# Patient Record
Sex: FEMALE | Race: WHITE | Employment: OTHER | URBAN - METROPOLITAN AREA
[De-identification: names, ages, dates, MRNs, and addresses within clinical notes are randomized per-mention and may not be internally consistent; named-entity substitution may affect disease eponyms.]

---

## 2017-03-15 RX ORDER — BUPROPION HYDROCHLORIDE 150 MG/1
150 TABLET ORAL
Qty: 90 TAB | Refills: 6 | Status: SHIPPED | OUTPATIENT
Start: 2017-03-15 | End: 2017-05-31 | Stop reason: SDUPTHER

## 2017-03-15 NOTE — TELEPHONE ENCOUNTER
Requested Prescriptions     Pending Prescriptions Disp Refills    buPROPion XL (WELLBUTRIN XL) 150 mg tablet 30 Tab 6     Sig: Take 1 Tab by mouth every morning.      Last OV-12/22/16  Next OV-6/30/17  Med refilled-12/22/16    Pt wants a 90 supply

## 2017-05-30 ENCOUNTER — DOCUMENTATION ONLY (OUTPATIENT)
Dept: INTERNAL MEDICINE CLINIC | Age: 67
End: 2017-05-30

## 2017-05-30 NOTE — PROGRESS NOTES
Medicare Part B Preventive Services Guidelines/Limitations Date last completed and Frequency Due Date   Bone Mass Measurement  (age 72 & older, biennial) Requires diagnosis related to osteoporosis or estrogen deficiency. Biennial benefit unless patient has history of long-term glucocorticoid tx or baseline is needed because initial test was by other method Completed Recommended every 2 years Due    Cardiovascular Screening Blood Tests (every 5 years)  Total cholesterol, HDL, Triglycerides Order as a panel if possible Completed   Recommended annually Due    Colorectal Cancer Screening  -Fecal occult blood test (annual)  -Flexible sigmoidoscopy (5y)  -Screening colonoscopy (10y)  -Barium Enema  Completed   Recommended every  years  Due     Counseling to Prevent Tobacco Use (up to 8 sessions per year)  - Counseling greater than 3 and up to 10 minutes  - Counseling greater than 10 minutes Patients must be asymptomatic of tobacco-related conditions to receive as preventive service     Diabetes Screening Tests (at least every 3 years, Medicare covers annually or at 6-month intervals for prediabetic patients)    Fasting blood sugar (FBS) or glucose tolerance test (GTT) Patient must be diagnosed with one of the following:  -Hypertension, Dyslipidemia, obesity, previous impaired FBS or GTT  Or any two of the following: overweight, FH of diabetes, age ? 72, history of gestational diabetes, birth of baby weighing more than 9 pounds Completed:     Recommended every 3 years for non-diabetics    Recommended every 3-6 months for Pre-Diabetics and Diabetics Due    Diabetes Self-Management Training (DSMT) (no USPSTF recommendation) Requires referral by treating physician for patient with diabetes or renal disease. 10 hours of initial DSMT session of no less than 30 minutes each in a continuous 12-month period. 2 hours of follow-up DSMT in subsequent years.      Glaucoma Screening (no USPSTF recommendation) Diabetes mellitus, family history, , age 48 or over,  American, age 72 or over Completed  Recommended annually Due   Human Immunodeficiency Virus (HIV) Screening (annually for increased risk patients)  HIV-1 and HIV-2 by EIA, NATALIE, rapid antibody test, or oral mucosa transudate Patient must be at increased risk for HIV infection per USPSTF guidelines or pregnant. Tests covered annually for patients at increased risk. Pregnant patients may receive up to 3 test during pregnancy. Medical Nutrition Therapy (MNT) (for diabetes or renal disease not recommended schedule) Requires referral by treating physician for patient with diabetes or renal disease. Can be provided in same year as diabetes self-management training (DSMT), and CMS recommends medical nutrition therapy take place after DSMT. Up to 3 hours for initial year and 2 hours in subsequent years. Prostate Cancer Screening (annually up to age 76)  - Digital rectal exam (TELLO)  - Prostate specific antigen (PSA) Annually (age 48 or over), TELLO not paid separately when covered E/M service is provided on same date Completed  Recommended annually to age 76 Due   Seasonal Influenza Vaccination (annually)  Completed   Recommended Annually Due    Pneumococcal Vaccination (once after 72)  Pneumococcal 23 -  Recommended once over the age of 72    Prevnar 15 - Recommended once over the age of 72 Complete        Complted   Hepatitis B Vaccinations (if medium/high risk) Medium/high risk factors:  End-stage renal disease,  Hemophiliacs who received Factor VIII or IX concentrates, Clients of institutions for the mentally retarded, Persons who live in the same house as a HepB virus carrier, Homosexual men, Illicit injectable drug abusers. Screening Mammography (biennial age 54-69)?  Annually (age 36 or over) Completed    Due    Screening Pap Tests and Pelvic Examination (up to age 79 and after 79 if unknown history or abnormal study last 10 years) Every 25 months except high risk Completed  Due   Ultrasound Screening for Abdominal Aortic Aneurysm (AAA) (once) Patient must be referred through IPPE and not have had a screening for abdominal aortic aneurysm before under Medicare.   Limited to patients who meet one of the following criteria:  - Men who are 73-68 years old and have smoked more than 100 cigarettes in their lifetime.  -Anyone with a FH of AAA  -[facility]     [facility]

## 2017-05-30 NOTE — PROGRESS NOTES
Medicare Part B Preventive Services Guidelines/Limitations Date last completed and Frequency Due Date   Bone Mass Measurement  (age 72 & older, biennial) Requires diagnosis related to osteoporosis or estrogen deficiency. Biennial benefit unless patient has history of long-term glucocorticoid tx or baseline is needed because initial test was by other method Completed 8/2015    Recommended every 2 years Due 8/2017   Cardiovascular Screening Blood Tests (every 5 years)  Total cholesterol, HDL, Triglycerides Order as a panel if possible Recommended annually  8/16 Due now   Colorectal Cancer Screening  -Fecal occult blood test (annual)  -Flexible sigmoidoscopy (5y)  -Screening colonoscopy (10y)  -Barium Enema   Completed 7/2014  Recommended every years  Due 2/2017 for multiple polyps, scheduled 2/17 with Keila Sahu     Counseling to Prevent Tobacco Use (up to 8 sessions per year)  - Counseling greater than 3 and up to 10 minutes  - Counseling greater than 10 minutes Patients must be asymptomatic of tobacco-related conditions to receive as preventive service Counseled 12/2016      Diabetes Screening Tests (at least every 3 years, Medicare covers annually or at 6-month intervals for prediabetic patients)     Fasting blood sugar (FBS) or glucose tolerance test (GTT) Patient must be diagnosed with one of the following:  -Hypertension, Dyslipidemia, obesity, previous impaired FBS or GTT  Or any two of the following: overweight, FH of diabetes, age ? 72, history of gestational diabetes, birth of baby weighing more than 9 pounds Recommended every 3 years for non-diabetics     Recommended every 3-6 months for Pre-Diabetics and Diabetics Due now   Diabetes Self-Management Training (DSMT) (no USPSTF recommendation) Requires referral by treating physician for patient with diabetes or renal disease. 10 hours of initial DSMT session of no less than 30 minutes each in a continuous 12-month period.  2 hours of follow-up DSMT in subsequent years. N/A  N/A   Glaucoma Screening (no USPSTF recommendation) Diabetes mellitus, family history, , age 48 or over,  American, age 72 or over Completed 11/2016    Recommended annually Due 11/2017   Human Immunodeficiency Virus (HIV) Screening (annually for increased risk patients)  HIV-1 and HIV-2 by EIA, NATALIE, rapid antibody test, or oral mucosa transudate Patient must be at increased risk for HIV infection per USPSTF guidelines or pregnant. Tests covered annually for patients at increased risk. Pregnant patients may receive up to 3 test during pregnancy.  N/A  N/A   Medical Nutrition Therapy (MNT) (for diabetes or renal disease not recommended schedule) Requires referral by treating physician for patient with diabetes or renal disease. Can be provided in same year as diabetes self-management training (DSMT), and CMS recommends medical nutrition therapy take place after DSMT. Up to 3 hours for initial year and 2 hours in subsequent years.  N/A  N/A   Prostate Cancer Screening (annually up to age 76)  - Digital rectal exam (TELLO)  - Prostate specific antigen (PSA) Annually (age 48 or over), TELLO not paid separately when covered E/M service is provided on same date N/A N/A   Seasonal Influenza Vaccination (annually)   Recommended Annually Due now, pt declined at 12/2016 visit   Pneumococcal Vaccination (once after 72)   Pneumococcal 23 -  Recommended once over the age of 72     Prevnar 15 - Recommended once over the age of 72 Due now           Due now, declined at last visit   Hepatitis B Vaccinations (if medium/high risk) Medium/high risk factors: End-stage renal disease,  Hemophiliacs who received Factor VIII or IX concentrates, Clients of institutions for the mentally retarded, Persons who live in the same house as a HepB virus carrier, Homosexual men, Illicit injectable drug abusers.  N/A  N/A   Screening Mammography (biennial age 54-69)?  Annually (age 36 or over) Completed 8/2016 Due 8/2017   Screening Pap Tests and Pelvic Examination (up to age 79 and after 79 if unknown history or abnormal study last 10 years) Every 25 months except high risk Completed 8/2016 Due 8/2018   Ultrasound Screening for Abdominal Aortic Aneurysm (AAA) (once) Patient must be referred through [facility] and not have had a screening for abdominal aortic aneurysm before under Medicare. Limited to patients who meet one of the following criteria:  - Men who are 73-68 years old and have smoked more than 100 cigarettes in their lifetime.  -Anyone with a FH of AAA  -Anyone recommended for screening by USPSTF  N/A  N/A     Please bring in a copy of your advanced directive to your next office visit so we can have a copy on file.

## 2017-05-31 ENCOUNTER — DOCUMENTATION ONLY (OUTPATIENT)
Dept: INTERNAL MEDICINE CLINIC | Age: 67
End: 2017-05-31

## 2017-05-31 ENCOUNTER — OFFICE VISIT (OUTPATIENT)
Dept: INTERNAL MEDICINE CLINIC | Age: 67
End: 2017-05-31

## 2017-05-31 VITALS
OXYGEN SATURATION: 96 % | BODY MASS INDEX: 31.18 KG/M2 | WEIGHT: 176 LBS | RESPIRATION RATE: 16 BRPM | DIASTOLIC BLOOD PRESSURE: 74 MMHG | HEIGHT: 63 IN | HEART RATE: 73 BPM | SYSTOLIC BLOOD PRESSURE: 143 MMHG | TEMPERATURE: 98.1 F

## 2017-05-31 DIAGNOSIS — Z13.39 SCREENING FOR ALCOHOLISM: ICD-10-CM

## 2017-05-31 DIAGNOSIS — Z01.818 PREOP EXAM FOR INTERNAL MEDICINE: Primary | ICD-10-CM

## 2017-05-31 DIAGNOSIS — I10 ESSENTIAL HYPERTENSION: ICD-10-CM

## 2017-05-31 DIAGNOSIS — F32.9 REACTIVE DEPRESSION: ICD-10-CM

## 2017-05-31 DIAGNOSIS — Z00.00 ROUTINE GENERAL MEDICAL EXAMINATION AT A HEALTH CARE FACILITY: ICD-10-CM

## 2017-05-31 DIAGNOSIS — R01.1 CARDIAC MURMUR: ICD-10-CM

## 2017-05-31 DIAGNOSIS — R91.8 ABNORMAL CT LUNG SCREENING: ICD-10-CM

## 2017-05-31 RX ORDER — ACETAMINOPHEN 325 MG/1
TABLET ORAL
COMMUNITY

## 2017-05-31 RX ORDER — LOSARTAN POTASSIUM 25 MG/1
25 TABLET ORAL DAILY
Qty: 30 TAB | Refills: 1 | Status: SHIPPED | OUTPATIENT
Start: 2017-05-31 | End: 2017-08-08

## 2017-05-31 RX ORDER — BUPROPION HYDROCHLORIDE 150 MG/1
150 TABLET ORAL
Qty: 90 TAB | Refills: 2 | Status: SHIPPED | OUTPATIENT
Start: 2017-05-31 | End: 2018-02-20 | Stop reason: SDUPTHER

## 2017-05-31 NOTE — TELEPHONE ENCOUNTER
Requested Prescriptions     Pending Prescriptions Disp Refills    losartan (COZAAR) 25 mg tablet 30 Tab 1     Sig: Take 1 Tab by mouth daily.

## 2017-05-31 NOTE — PATIENT INSTRUCTIONS
Medicare Part B Preventive Services Guidelines/Limitations Date last completed and Frequency Due Date   Bone Mass Measurement  (age 72 & older, biennial) Requires diagnosis related to osteoporosis or estrogen deficiency. Biennial benefit unless patient has history of long-term glucocorticoid tx or baseline is needed because initial test was by other method Completed 8/2015     Recommended every 2 years Due 8/2017   Cardiovascular Screening Blood Tests (every 5 years)  Total cholesterol, HDL, Triglycerides Order as a panel if possible Recommended annually  8/16 Due now   Colorectal Cancer Screening  -Fecal occult blood test (annual)  -Flexible sigmoidoscopy (5y)  -Screening colonoscopy (10y)  -Barium Enema    Completed 4/17  Recommended every 5 years  Due 4/2022 with Keila Sahu     Counseling to Prevent Tobacco Use (up to 8 sessions per year)  - Counseling greater than 3 and up to 10 minutes  - Counseling greater than 10 minutes Patients must be asymptomatic of tobacco-related conditions to receive as preventive service Counseled 12/2016      Diabetes Screening Tests (at least every 3 years, Medicare covers annually or at 6-month intervals for prediabetic patients)      Fasting blood sugar (FBS) or glucose tolerance test (GTT) Patient must be diagnosed with one of the following:  -Hypertension, Dyslipidemia, obesity, previous impaired FBS or GTT  Or any two of the following: overweight, FH of diabetes, age ? 72, history of gestational diabetes, birth of baby weighing more than 9 pounds Recommended every 3 years for non-diabetics      Recommended every 3-6 months for Pre-Diabetics and Diabetics Due now   Diabetes Self-Management Training (DSMT) (no USPSTF recommendation) Requires referral by treating physician for patient with diabetes or renal disease. 10 hours of initial DSMT session of no less than 30 minutes each in a continuous 12-month period. 2 hours of follow-up DSMT in subsequent years.  N/A  N/A   Glaucoma Screening (no USPSTF recommendation) Diabetes mellitus, family history, , age 48 or over,  American, age 72 or over Completed 11/2016     Recommended annually Due 11/2017   Human Immunodeficiency Virus (HIV) Screening (annually for increased risk patients)  HIV-1 and HIV-2 by EIA, NATALIE, rapid antibody test, or oral mucosa transudate Patient must be at increased risk for HIV infection per USPSTF guidelines or pregnant. Tests covered annually for patients at increased risk. Pregnant patients may receive up to 3 test during pregnancy.  N/A  N/A   Medical Nutrition Therapy (MNT) (for diabetes or renal disease not recommended schedule) Requires referral by treating physician for patient with diabetes or renal disease. Can be provided in same year as diabetes self-management training (DSMT), and CMS recommends medical nutrition therapy take place after DSMT. Up to 3 hours for initial year and 2 hours in subsequent years.  N/A  N/A   Prostate Cancer Screening (annually up to age 76)  - Digital rectal exam (TELLO)  - Prostate specific antigen (PSA) Annually (age 48 or over), TELLO not paid separately when covered E/M service is provided on same date N/A N/A   Seasonal Influenza Vaccination (annually)    Recommended Annually Due now, pt declined at 12/2016 visit   Pneumococcal Vaccination (once after 72)    Pneumococcal 23 -  Recommended once over the age of 72  [de-identified]  Prevnar 15 - Recommended once over the age of 72 Due now declined  [de-identified]          Due now, declined at last visit   Hepatitis B Vaccinations (if medium/high risk) Medium/high risk factors: End-stage renal disease,  Hemophiliacs who received Factor VIII or IX concentrates, Clients of institutions for the mentally retarded, Persons who live in the same house as a HepB virus carrier, Homosexual men, Illicit injectable drug abusers.  N/A  N/A   Screening Mammography (biennial age 54-69)?  Annually (age 36 or over) Completed 8/2016 Due 8/2017 Screening Pap Tests and Pelvic Examination (up to age 79 and after 79 if unknown history or abnormal study last 10 years) Every 24 months except high risk Completed 8/2016 Due 8/2018   Ultrasound Screening for Abdominal Aortic Aneurysm (AAA) (once) Patient must be referred through [facility] and not have had a screening for abdominal aortic aneurysm before under Medicare. Limited to patients who meet one of the following criteria:  - Men who are 73-68 years old and have smoked more than 100 cigarettes in their lifetime.  -Anyone with a FH of AAA  -Anyone recommended for screening by USPSTF  N/A--no family history of this  N/A      Please bring in a copy of your advanced directive to your next office visit so we can have a copy on file.       Return for bloodwork

## 2017-05-31 NOTE — MR AVS SNAPSHOT
Visit Information Date & Time Provider Department Dept. Phone Encounter #  
 5/31/2017  2:00 PM Jair Mccarthy, [address] [phone] [id number] Upcoming Health Maintenance Date Due Hepatitis C Screening 1950 ZOSTER VACCINE AGE 60> 5/6/2012 GLAUCOMA SCREENING Q2Y 5/6/2017 OSTEOPOROSIS SCREENING (DEXA) 5/6/2017 Pneumococcal 65+ Low/Medium Risk (1 of 2 - PCV13) 5/6/2017 MEDICARE YEARLY EXAM 5/6/2017 INFLUENZA AGE 9 TO ADULT 8/1/2017 BREAST CANCER SCRN MAMMOGRAM 8/18/2018 COLONOSCOPY 7/14/2019 PAP AKA CERVICAL CYTOLOGY 8/18/2019 DTaP/Tdap/Td series (2 - Td) 1/2/2022 Allergies as of 5/31/2017  Review Complete On: 5/31/2017 By: Harpreet Thompson LPN Severity Noted Reaction Type Reactions Erythromycin  12/22/2016    Other (comments) Stomach cramps Morphine  12/22/2016    Itching Current Immunizations  Never Reviewed No immunizations on file. Not reviewed this visit You Were Diagnosed With   
  
 Codes Comments Abnormal CT lung screening    -  Primary ICD-10-CM: R91.8 ICD-9-CM: 793.2 Routine general medical examination at a health care facility     ICD-10-CM: Z00.00 ICD-9-CM: V70.0 Screening for alcoholism     ICD-10-CM: Z13.89 ICD-9-CM: V79.1 Reactive depression     ICD-10-CM: F32.9 ICD-9-CM: 300.4 Preop exam for internal medicine     ICD-10-CM: Z01.818 ICD-9-CM: V72.83 Elevated BP without diagnosis of hypertension     ICD-10-CM: R03.0 ICD-9-CM: 796.2 Cardiac murmur     ICD-10-CM: R01.1 ICD-9-CM: 470. 2 Vitals BP Pulse Temp Resp Height(growth percentile) Weight(growth percentile) 143/74 (BP 1 Location: Left arm, BP Patient Position: Sitting) 73 98.1 °F (36.7 °C) (Oral) 16 5' 3\" (1.6 m) 176 lb (79.8 kg) SpO2 BMI OB Status Smoking Status 96% 31.18 kg/m2 Hysterectomy Current Every Day Smoker Vitals History BMI and BSA Data Body Mass Index Body Surface Area  
 31.18 kg/m 2 1.88 m 2 Preferred Pharmacy Pharmacy Name Phone [facility] [address] [place] [address] [id number] [facility] [address] [phone] Your Updated Medication List  
  
   
This list is accurate as of: 5/31/17  2:45 PM.  Always use your most recent med list.  
  
  
  
  
 buPROPion  mg tablet Commonly known as:  Andes Slough Take 1 Tab by mouth every morning. latanoprost 0.005 % ophthalmic solution Commonly known as:  XALATAN  
  
 TYLENOL 325 mg tablet Generic drug:  acetaminophen Take  by mouth every four (4) hours as needed for Pain. Prescriptions Sent to Pharmacy Refills buPROPion XL (WELLBUTRIN XL) 150 mg tablet 2 Sig: Take 1 Tab by mouth every morning. Class: Normal  
 Pharmacy: [address] [place] [address], [facility]  #: [phone] Route: Oral  
  
We Performed the Following REFERRAL TO OBSTETRICS AND GYNECOLOGY [REF51 Custom] Comments:  
 Please evaluate patient for pelvic REFERRAL TO PULMONARY DISEASE [IEN40 Custom] Comments:  
 Please evaluate patient for abnl ct Referral Information Referral ID Referred By Referred To  
  
 [id number] [facility] [address], [address] [place] Visits Status Start Date End Date 1 New Request 5/31/17 5/31/18 If your referral has a status of pending review or denied, additional information will be sent to support the outcome of this decision. Referral ID Referred By Referred To  
 [id number] Dee Lee NP  
   [address] [facility] [address] Phone: [phone] Fax: [phone] Visits Status Start Date End Date 1 New Request 5/31/17 5/31/18  If your referral has a status of pending review or denied, additional information will be sent to support the outcome of this decision. Patient Instructions Medicare Part B Preventive Services Guidelines/Limitations Date last completed and Frequency Due Date Bone Mass Measurement 
(age 72 & older, biennial) Requires diagnosis related to osteoporosis or estrogen deficiency. Biennial benefit unless patient has history of long-term glucocorticoid tx or baseline is needed because initial test was by other method Completed 8/2015 
  
Recommended every 2 years Due 8/2017 Cardiovascular Screening Blood Tests (every 5 years) Total cholesterol, HDL, Triglycerides Order as a panel if possible Recommended annually 8/16 Due now Colorectal Cancer Screening 
-Fecal occult blood test (annual) -Flexible sigmoidoscopy (5y) 
-Screening colonoscopy (10y) -Barium Enema    Completed 4/17 Recommended every 5 years  Due 4/2022 with [facility]    
Counseling to Prevent Tobacco Use (up to 8 sessions per year) - Counseling greater than 3 and up to 10 minutes - Counseling greater than 10 minutes Patients must be asymptomatic of tobacco-related conditions to receive as preventive service Counseled 12/2016     
Diabetes Screening Tests (at least every 3 years, Medicare covers annually or at 6-month intervals for prediabetic patients) 
   
Fasting blood sugar (FBS) or glucose tolerance test (GTT) Patient must be diagnosed with one of the following: 
-Hypertension, Dyslipidemia, obesity, previous impaired FBS or GTT 
Or any two of the following: overweight, FH of diabetes, age ? 72, history of gestational diabetes, birth of baby weighing more than 9 pounds Recommended every 3 years for non-diabetics 
   
Recommended every 3-6 months for Pre-Diabetics and Diabetics Due now Diabetes Self-Management Training (DSMT) (no USPSTF recommendation) Requires referral by treating physician for patient with diabetes or renal disease.  10 hours of initial DSMT session of no less than 30 minutes each in a continuous 12-month period. 2 hours of follow-up DSMT in subsequent years. N/A  N/A Glaucoma Screening (no USPSTF recommendation) Diabetes mellitus, family history, , age 48 or over,  American, age 72 or over Completed 11/2016 
  
Recommended annually Due 11/2017 Human Immunodeficiency Virus (HIV) Screening (annually for increased risk patients) HIV-1 and HIV-2 by EIA, NATALIE, rapid antibody test, or oral mucosa transudate Patient must be at increased risk for HIV infection per USPSTF guidelines or pregnant. Tests covered annually for patients at increased risk. Pregnant patients may receive up to 3 test during pregnancy.  N/A  N/A Medical Nutrition Therapy (MNT) (for diabetes or renal disease not recommended schedule) Requires referral by treating physician for patient with diabetes or renal disease. Can be provided in same year as diabetes self-management training (DSMT), and CMS recommends medical nutrition therapy take place after DSMT. Up to 3 hours for initial year and 2 hours in subsequent years.  N/A  N/A Prostate Cancer Screening (annually up to age 76) - Digital rectal exam (TELLO) - Prostate specific antigen (PSA) Annually (age 48 or over), TELLO not paid separately when covered E/M service is provided on same date N/A N/A Seasonal Influenza Vaccination (annually)    Recommended Annually Due now, pt declined at 12/2016 visit Pneumococcal Vaccination (once after 65)    Pneumococcal 23 - Recommended once over the age of 72 
   
Prevnar 15 - Recommended once over the age of 72 Due now declined    
   
   
Due now, declined at last visit Hepatitis B Vaccinations (if medium/high risk) Medium/high risk factors: End-stage renal disease, Hemophiliacs who received Factor VIII or IX concentrates, Clients of institutions for the mentally retarded, Persons who live in the same house as a HepB virus carrier, Homosexual men, Illicit injectable drug abusers.   N/A  N/A  
 Screening Mammography (biennial age 54-69)? Annually (age 36 or over) Completed 8/2016 Due 8/2017 Screening Pap Tests and Pelvic Examination (up to age 79 and after 79 if unknown history or abnormal study last 10 years) Every 24 months except high risk Completed 8/2016 Due 8/2018 Ultrasound Screening for Abdominal Aortic Aneurysm (AAA) (once) Patient must be referred through IPPE and not have had a screening for abdominal aortic aneurysm before under Medicare. Limited to patients who meet one of the following criteria: 
- Men who are 73-68 years old and have smoked more than 100 cigarettes in their lifetime. 
-Anyone with a FH of AAA 
-Anyone recommended for screening by USPSTF  N/A--no family history of this  N/A  
  
Please bring in a copy of your advanced directive to your next office visit so we can have a copy on file. Return for bloodwork Introducing [facility] SERVICES! Dear Haylie Sparrow: 
Thank you for requesting a "[employer]" account. Our records indicate that you already have an active "[employer] account. You can access your account anytime at [email or web address] Did you know that you can access your hospital and ER discharge instructions at any time in "[employer]"? You can also review all of your test results from your hospital stay or ER visit. Additional Information If you have questions, please visit the Frequently Asked Questions section of the "[employer]" website at [email or web address] Remember, "[employer]" is NOT to be used for urgent needs. For medical emergencies, dial 911. Now available from your iPhone and Android! Please provide this summary of care documentation to your next provider. Your primary care clinician is listed as Anum Whitehead. If you have any questions after today's visit, please call [phone].

## 2017-05-31 NOTE — PROGRESS NOTES
This is a \"Welcome to [employer]"  Initial Preventive Physical Examination (IPPE) providing Personalized Prevention Plan Services (Performed in the first 12 months of enrollment)    I have reviewed the patient's medical history in detail and updated the computerized patient record. History   History reviewed. No pertinent past medical history. Past Surgical History:   Procedure Laterality Date    HX GYN      Complete hysterectomy 1993    HX HERNIA REPAIR  1982     Current Outpatient Prescriptions   Medication Sig Dispense Refill    acetaminophen (TYLENOL) 325 mg tablet Take  by mouth every four (4) hours as needed for Pain.  buPROPion XL (WELLBUTRIN XL) 150 mg tablet Take 1 Tab by mouth every morning. 90 Tab 6    latanoprost (XALATAN) 0.005 % ophthalmic solution        Allergies   Allergen Reactions    Erythromycin Other (comments)     Stomach cramps    Morphine Itching     Family History   Problem Relation Age of Onset    Hypertension Mother     Diabetes Father     Diabetes Sister     Diabetes Brother     Hypertension Brother      Social History   Substance Use Topics    Smoking status: Current Every Day Smoker     Packs/day: 1.50    Smokeless tobacco: Never Used    Alcohol use No     Diet, Lifestyle: not attempting to follow a low sodium diet, sedentary, smoker 1ppd, caffeine intake 1-2coffee, alcohol intake none    Exercise level: not active  Does not exercise planning on joining [employer]  Depression Risk Screen     PHQ over the last two weeks 5/31/2017   Little interest or pleasure in doing things Not at all   Feeling down, depressed or hopeless Not at all   Total Score PHQ 2 0   no depression since wellbutrin  Alcohol Risk Screen   On any occasion during the past 3 months, have you had more than 3 drinks containing alcohol? No    Do you average more than 7 drinks per week?   No  Rare alcohol   Functional Ability and Level of Safety     Hearing Loss   normal-to-mild  No difficulty  Activities of Daily Living   Self-care     Fall Risk Screen     Fall Risk Assessment, last 12 mths 5/31/2017   Able to walk? Yes   Fall in past 12 months? No   no falls  Abuse Screen   Patient is not abused  Lives with . Review of Systems   A comprehensive review of systems was negative except for that written in the HPI. Physical Examination     No exam data present     Visit Vitals    /72 (BP 1 Location: Left arm, BP Patient Position: Sitting)    Pulse 73    Temp 98.1 °F (36.7 °C) (Oral)    Resp 16    Ht 5' 3\" (1.6 m)    Wt 176 lb (79.8 kg)    SpO2 96%    BMI 31.18 kg/m2     General:  Alert, cooperative, no distress, appears stated age. Head:  Normocephalic, without obvious abnormality, atraumatic. Eyes:  Conjunctivae/corneas clear. PERRL, EOMs intact. Ears:  Normal TMs and external ear canals both ears. Nose: Nares normal. Septum midline. Mucosa normal. No drainage or sinus tenderness. Throat: Lips, mucosa, and tongue normal. Teeth and gums normal.   Neck: Supple, symmetrical, trachea midline, no adenopathy, thyroid: no enlargement/tenderness/nodules, no carotid bruit and no JVD. Back:   Symmetric, no curvature. ROM normal. No CVA tenderness. Lungs:   Clear to auscultation bilaterally. Chest wall:  No tenderness or deformity. Heart:  Regular rate and rhythm, S1, S2 normal, + murmur, click, rub or gallop. Abdomen:   Soft, non-tender. No masses,  No organomegaly. Extremities: Extremities normal, atraumatic, no cyanosis or edema. Pulses: 2+ and symmetric all extremities. Skin: Skin color, texture, turgor normal. No rashes or lesions. Lymph nodes: Cervical, supraclavicular, and axillary nodes normal.   Neurologic: . Normal strength, sensation       EKG Screening: normal EKG, normal sinus rhythm, unchanged from previous tracings.  Completed 12/16 no need to repeat today, patient declined repeat    Patient Care Team:  Iram Milner MD as PCP - General (Internal Medicine)  Charmayne Hopkins, MD (Otolaryngology)  Renuka Self MD (Colon and Rectal Surgery)  justina (Ophthalmology)  Dieudonne Katz MD (Ophthalmology)  French Lee NP (Obstetrics & Gynecology)     End-of-life planning  Advanced Directive discussed and documented: YES      Assessment/Plan   Education and counseling provided:  Are appropriate based on today's review and evaluation  End-of-Life planning (with patient's consent)  Screening Mammography  Screening Pap and pelvic (covered once every 2 years)  Cardiovascular screening blood test  Bone mass measurement (DEXA)  Diabetes screening test    ICD-10-CM ICD-9-CM    1. Abnormal CT lung screening R91.8 793.2    2. Routine general medical examination at a health care facility Z00.00 V70.0 AMB POC EKG ROUTINE W/ 12 LEADS, SCREEN ()   3. Screening for alcoholism Z13.89 V79.1 AMB POC EKG ROUTINE W/ 12 LEADS, SCREEN ()   . ACP planning begun today, SDM is.  nelly, or son nelly      Discussed with patient about advance medical directive. Provided patient blank AMD and Your Right to Decide Booklet. Requested that if completed to provide a copy of AMD to office. Colonoscopy:  with Dr. Herber Urias at [facility], repeat in 5 years, will obtain results  Pap: Leeann Lee, 16 due   Mammogram: 16 negative, due   Dexa: 8/13/15, osteopenia, due     Tdap:   Pneumovax: declines  [id number]: declines  Zostavax: declines  Flu shot: declines    Eye exam: Dr. Torres Henderson, , glaucoma annual, getting cataract surgery     EK16 nsr    Hep C:  ordered   Lipids: , , ordered  a1c ordered    CT lung screen: 16 - nodule in trachea annual check   AAA screen: not needed - no family history of AAA  Head MRI:  for family history of brain aneurysm, negative, [place] [facility] complete    Medication reconciliation completed by MA and reviewed by me. Medical/surgical/social/family history reviewed and updated by me. Patient provided AVS and preventative screening table. Patient verbalized understanding of all information discussed.

## 2017-05-31 NOTE — PROGRESS NOTES
Preop forms completed, signed, and faxed to [facility] at [phone] w/ confirmation received. Form placed in scanning and a copy mailed to pt.

## 2017-05-31 NOTE — PROGRESS NOTES
HISTORY OF PRESENT ILLNESS  Misty Bai is a 74 y.o. female. HPI  Last seen on 12/22/16. Pt is here for pre op care. Pt is scheduled to undergo cataract surgery with Dr. Tr Kwong ([facility]) in [place]. She denies CP, SOB, PND, claudication, orthopnea, dyspnea on exertion  She is able to walk around the block and vacuum. Functional mets >4. BP today is 146/72, will repeat today  Elevated initially at last visit, but improved on repeat  Pt has not been told she has HTN in the past  She has not been checking her BP at home. Wt is up 7 lbs today  She does not exercises regularly, but is considering joining at [employer]. She is not following any particular diet  Discussed diet and weight loss   Started wellbutrin at last visit and this will help weight loss     Pt follows at [address] [place] [address], ZEE Lee (gyn)   She last saw her 8/18/16     At last visit, I had started her on 150 mg wellbutrin  She feels this has been improving her depression symptoms of feeling sad and down. She denies side effects with this medication. Reviewed blood work from 8/18/16 per [address] [place] [address]  , thyroid nl, vit D nl, blood counts nl  She did not complete labs before today's visit, not fasting today. Plan to have fasting labs completed 6/2/17    Pt is current daily smoker  She has cut down to 1 PPD. She completed CT lung screen in 12/16  Reviewed CT: No abnormal lung nodule identified. Granuloma. CAD. Tracheal nodularity versus remnants of mucus. Follow-up strongly recommended. Lung-RADS Category: 2, benign. Management recommendation: Low dose screening CT recommended in 12 months. She followed with Dr. Lida Alex  Reviewed note (1/13/17): He reviewed CT and area in trachea did not appear nodular. He wanted to refer her to pulmonology.    She was not provided information on pulmonologist.  Carlos Mcginnis on cessation   Recall she has tried chantix in the past but did not tolerate this  Recall 30 years over a pack a day--- 45 pack year history, ordered this for her     She reports ECHO . Hx of heart murmur. She denies recent falls. Discussed ACP planning. PREVENTIVE:  Colonoscopy:  with Dr. Geneva Webster at [facility], repeat in 5 years, will obtain results  Pap: Leeann Lee, 16  Mammogram: 16 negative, due   Dexa: 8/13/15, osteopenia, due   Tdap:   Pneumovax: declines  [id number]: declines  Zostavax: declines  Flu shot: declines  Eye exam: Dr. Remi Rinaldi, , glaucoma  EK16 nsr  Hep C:  ordered   Lipids: , , ordered  CT lung screen: 16 - nodule in trachea  AAA: not needed - no family history of AAA  Head MRI:  for family history of brain aneurysm, negative, Keila Montez    Patient Active Problem List    Diagnosis Date Noted    Reactive depression 2016     Current Outpatient Prescriptions   Medication Sig Dispense Refill    acetaminophen (TYLENOL) 325 mg tablet Take  by mouth every four (4) hours as needed for Pain.  buPROPion XL (WELLBUTRIN XL) 150 mg tablet Take 1 Tab by mouth every morning. 90 Tab 6    latanoprost (XALATAN) 0.005 % ophthalmic solution        Past Surgical History:   Procedure Laterality Date    HX GYN      Complete hysterectomy     HX HERNIA REPAIR        No results found for: WBC, WBCLT, HGBPOC, HGB, HGBP, HGBEXT, HCTPOC, HCT, HCTEXT, PHCT, RBCH, PLT, PLTEXT, MCV, HGBEXT, HCTEXT, PLTEXT    No results found for: CHOL, CHOLX, CHLST, CHOLV, HDL, LDL, DLDL, LDLC, DLDLP, TGL, TGLX, TRIGL, [id number], TRIGP, CHHD, CHHDX    No results found for: CGFR, GFRAA, GFRNA, CRCLT, [id number], CCT, CHACHA, CREAPOC, MCREA, ACREA, CREA, REFC3, REFC4, BUN, BUNPOC, IBUN, MBUNV, BUNV, NAPOC, NA, PNA, WBNA, K, KPOCT, KI, PLK, WBK, CLPOC, PCL, CL, WBCL, CO2, DIG, DIGP, MDIG      Review of Systems   Constitutional: Negative for chills and fever. HENT: Negative for hearing loss and tinnitus.     Eyes: Negative for blurred vision and double vision. Respiratory: Negative for shortness of breath and wheezing. Cardiovascular: Negative for chest pain, palpitations, orthopnea, claudication, leg swelling and PND. Gastrointestinal: Negative for nausea and vomiting. Genitourinary: Negative for dysuria and frequency. Musculoskeletal: Negative for back pain and falls. Skin: Negative for itching and rash. Neurological: Negative for dizziness, loss of consciousness and headaches. Psychiatric/Behavioral: Positive for depression. The patient is not nervous/anxious. Physical Exam   Constitutional: She is oriented to person, place, and time. She appears well-developed and well-nourished. No distress. HENT:   Head: Normocephalic and atraumatic. Right Ear: External ear normal.   Left Ear: External ear normal.   Mouth/Throat: Oropharynx is clear and moist. No oropharyngeal exudate. Eyes: Conjunctivae and EOM are normal. Right eye exhibits no discharge. Left eye exhibits no discharge. Neck: Normal range of motion. Neck supple. No carotid bruits   Cardiovascular: Normal rate and regular rhythm. Exam reveals no gallop and no friction rub. Murmur (best heard at LSB) heard. Pulmonary/Chest: Effort normal and breath sounds normal. No respiratory distress. She has no wheezes. She has no rales. She exhibits no tenderness. Abdominal: Soft. She exhibits no distension and no mass. There is no tenderness. There is no rebound and no guarding. Musculoskeletal: Normal range of motion. She exhibits no edema, tenderness or deformity. Lymphadenopathy:     She has no cervical adenopathy. Neurological: She is alert and oriented to person, place, and time. Coordination normal.   Skin: Skin is warm and dry. No rash noted. She is not diaphoretic. No erythema. No pallor. Psychiatric: She has a normal mood and affect. Her behavior is normal.       ASSESSMENT and PLAN    ICD-10-CM ICD-9-CM    1.  Abnormal CT lung screening    Placed referral for pulm. Pt has seen ENT regarding trachel nodularity and recommended pulm to f/u on this. R91.8 793.2 REFERRAL TO PULMONARY DISEASE   2. Routine general medical examination at a health care facility Z00.00 V70.0 REFERRAL TO OBSTETRICS AND GYNECOLOGY      CANCELED: AMB POC EKG ROUTINE W/ 12 LEADS, SCREEN ()   3. Screening for alcoholism Z13.89 V79.1 CANCELED: AMB POC EKG ROUTINE W/ 12 LEADS, SCREEN ()   4. Reactive depression    Much improved on 150 mg wellbutrin daily. Continue. No change to dose. F32.9 300.4    5. Preop exam for internal medicine    Pt is low risk for a low risk surgery. Recent EKG was NSR. Pt has functional mets >4. No signs or sxs of CHF or CAD. She may proceed to cataract surgery without further cardiac evaluation. Z01.818 V72.83    6. Hypertension    Start 25 mg losartan daily. 7. Cardiac murmur    Reports long hx of cardiac murmur. Had ECHO in past. Murmur is 2/6, no repeat ECHO needed at this time. R01.1 785. 2         Written by Edgar Ruffin, as dictated by Brian Traylor MD.     Current diagnosis and concerns discussed with pt at length. Understands risks and benefits or current treatment plan and medications and accepts the treatment and medication with any possible risks.   Pt asks appropriate questions which were answered.   Pt instructed to call with any concerns or problems. This note will not be viewable in [address]

## 2017-06-16 ENCOUNTER — TELEPHONE (OUTPATIENT)
Dept: INTERNAL MEDICINE CLINIC | Age: 67
End: 2017-06-16

## 2017-06-16 RX ORDER — HYDROCHLOROTHIAZIDE 25 MG/1
25 TABLET ORAL DAILY
Qty: 30 TAB | Refills: 3 | Status: SHIPPED | OUTPATIENT
Start: 2017-06-16 | End: 2017-10-27 | Stop reason: SDUPTHER

## 2017-06-16 NOTE — TELEPHONE ENCOUNTER
Called, spoke to pt. Two pt identifiers confirmed. Pt states that itching all over x 5/31/17 from Losartan. Pt states intermittently. Pt taking qhs. Pt sx's worse at night. Pt states feeling \"strange/lightheadedness/un\" intermittently. x2wks or so. Pt states that she has been having these side effects since starting the medication. Pt advised that the readings are good that she has stated. Pt advised to hold losartan for now until Dr. Mauricio Ang responds w/ recommendations. Pt advised to monitor BP, and use benadryl for itching. Pt informed that LPN RR will f/u w/ PCP rec's 6/19/17. Pt verbalized understanding of information discussed w/ no further questions at this time.

## 2017-06-16 NOTE — TELEPHONE ENCOUNTER
Patient called and said she was placed on a Bp medication in May and this morning her reading was 135/68 and around 2:00 pm she felt lightheaded and her readings was 119/60 then 138/75 at 2:45 pm. She would like to know if she should stop taking the medication until she is seen in the office again. Please give her a call back in regards to this at [phone].

## 2017-06-19 NOTE — TELEPHONE ENCOUNTER
Called, spoke to pt. Two pt identifiers confirmed. Pt informed per Dr. Radha Sotomayor to start with half a tablet of HCTZ daily. Pt verbalized understanding of information discussed w/ no further questions at this time.

## 2017-06-19 NOTE — TELEPHONE ENCOUNTER
MD Louise Moon LPN        Caller: Unspecified (3 days ago,  3:24 PM)                     Start her on half tablet of hydrochlorthiazide daily

## 2017-06-19 NOTE — TELEPHONE ENCOUNTER
Called, spoke to pt. Two pt identifiers confirmed. Pt states that the itching and lightheadedness have subsided since holding the losartan. Pt states that BP over the weekend was:   Friday night-134/69  6/17//77 and 147/74  6/18//71  6/19//79  Pt informed that PCP will be informed. Pt verbalized understanding of information discussed w/ no further questions at this time.

## 2017-08-08 ENCOUNTER — OFFICE VISIT (OUTPATIENT)
Dept: INTERNAL MEDICINE CLINIC | Age: 67
End: 2017-08-08

## 2017-08-08 VITALS
BODY MASS INDEX: 30.12 KG/M2 | RESPIRATION RATE: 16 BRPM | SYSTOLIC BLOOD PRESSURE: 134 MMHG | HEART RATE: 60 BPM | DIASTOLIC BLOOD PRESSURE: 75 MMHG | HEIGHT: 63 IN | TEMPERATURE: 97.9 F | OXYGEN SATURATION: 97 % | WEIGHT: 170 LBS

## 2017-08-08 DIAGNOSIS — F32.9 REACTIVE DEPRESSION: ICD-10-CM

## 2017-08-08 DIAGNOSIS — I10 ESSENTIAL HYPERTENSION: ICD-10-CM

## 2017-08-08 DIAGNOSIS — Z01.818 PREOP EXAM FOR INTERNAL MEDICINE: Primary | ICD-10-CM

## 2017-08-08 NOTE — MR AVS SNAPSHOT
Visit Information Date & Time Provider Department Dept. Phone Encounter #  
 8/8/2017 10:00 AM Tay Lua, [address] [id number] Follow-up Instructions Return for as scheduled. Your Appointments 12/1/2017 10:15 AM  
ROUTINE CARE with Tay Lua, [address] [place] [address],[address] [facility] [facility]) Appt Note: 6 month follow up  
 [facility] [address] [address]  
[address] [facility] [address] [place] [address] Upcoming Health Maintenance Date Due Hepatitis C Screening 1950 OSTEOPOROSIS SCREENING (DEXA) 5/6/2017 MEDICARE YEARLY EXAM 6/1/2018 Pneumococcal 65+ Low/Medium Risk (2 of 2 - PPSV23) 8/8/2018 BREAST CANCER SCRN MAMMOGRAM 8/18/2018 COLONOSCOPY 7/14/2019 GLAUCOMA SCREENING Q2Y 8/3/2019 DTaP/Tdap/Td series (2 - Td) 1/2/2022 Allergies as of 8/8/2017  Review Complete On: 5/31/2017 By: Tay Lua MD  
  
 Severity Noted Reaction Type Reactions Erythromycin  12/22/2016    Other (comments) Stomach cramps Morphine  12/22/2016    Itching Current Immunizations  Never Reviewed No immunizations on file. Not reviewed this visit You Were Diagnosed With   
  
 Codes Comments Preop exam for internal medicine    -  Primary ICD-10-CM: T37.675 ICD-9-CM: V72.83 Essential hypertension     ICD-10-CM: I10 
ICD-9-CM: 401.9 Reactive depression     ICD-10-CM: F32.9 ICD-9-CM: 300.4 Vitals BP Pulse Temp Resp Height(growth percentile) Weight(growth percentile) 134/75 (BP 1 Location: Left arm, BP Patient Position: Sitting) 60 97.9 °F (36.6 °C) (Oral) 16 5' 3\" (1.6 m) 170 lb (77.1 kg) SpO2 BMI OB Status Smoking Status 97% 30.11 kg/m2 Hysterectomy Current Every Day Smoker Vitals History BMI and BSA Data  Body Mass Index Body Surface Area  
 30.11 kg/m 2 1.85 m 2  
  
  
 Preferred Pharmacy Pharmacy Name Phone [facility] [address], [place] - [address] [place] [address] Debra Acevedo [phone] Your Updated Medication List  
  
   
This list is accurate as of: 8/8/17 10:39 AM.  Always use your most recent med list.  
  
  
  
  
 buPROPion  mg tablet Commonly known as:  [place] [employer] Take 1 Tab by mouth every morning. hydroCHLOROthiazide 25 mg tablet Commonly known as:  HYDRODIURIL Take 1 Tab by mouth daily. latanoprost 0.005 % ophthalmic solution Commonly known as:  XALATAN  
  
 TYLENOL 325 mg tablet Generic drug:  acetaminophen Take  by mouth every four (4) hours as needed for Pain. We Performed the Following CBC W/O DIFF [35879 CPT(R)] Follow-up Instructions Return for as scheduled. Introducing [facility] SERVICES! Dear Naga Enriquez: 
Thank you for requesting a [employer] account. Our records indicate that you already have an active [employer] account. You can access your account anytime at [email or web address] Did you know that you can access your hospital and ER discharge instructions at any time in [employer]? You can also review all of your test results from your hospital stay or ER visit. Additional Information If you have questions, please visit the Frequently Asked Questions section of the [employer] website at [email or web address] Remember, [employer] is NOT to be used for urgent needs. For medical emergencies, dial 911. Now available from your iPhone and Android! Please provide this summary of care documentation to your next provider. Your primary care clinician is listed as Analisa Scott. If you have any questions after today's visit, please call [phone].

## 2017-08-08 NOTE — PROGRESS NOTES
HISTORY OF PRESENT ILLNESS  Fartun Arriola is a 74 y.o. female. HPI   Last here 5/31/17. Pt is here for pre-op care    BP today is good-134/75  Continues HCTZ 12.5mg daily only at this time  She had called about the losartan since last visit and we stopped this  I had started losartan but her BP dropped on this and caused itching   Since last visit, I started the HCTZ instead, tolerating this quite well     Pt has her second cataract surgery pending with Dr. Schmitt Degree (ophtho) 8/23/17  Pt brought in paperwork for me to complete for her today- signed this for her   She had to have her second surgery postponed after using drops and having reaction    Pt denies cp, sob, palpitations, orthopnea, claudication, PND, and new swelling in legs  She can sleep laying flat but uses multiple pillows  She can walk up a set of stairs  Pt can walk around the mall   Pt can vacuum and do laundry    Functional mets >>4  Last EKG was in 12/16, reviewed: normal sinus rhythm     Continues wellbutrin 150mg daily for depression, works well, happy with dose  This is supposed to be helping her with smoking cessation as well   Advised working on tapering back    Wt is down 6 lbs since last visit  Discussed continued diet and weight loss     Reviewed last labs per [facility] 8/16  Pt told me she would complete labs in 6/17 but did not do this  Pt is overdue to complete repeat lab work-ordered again today  She will complete the labs today     Pt continues to smoke  Currently smoking 1ppd  Counseled her on smoking cessation  Recall 40 pack year history. Completed CT lung cancer screening 12/16- tracheal nodule      PREVENTIVE:  Colonoscopy: 4/16, Dr. Shaquille Mata, repeat 5 years, will get report from [facility]?   AAA: not needed, denies fmhx  Pap: Leeann Lee, 8/18/16, scheduled for 9/17   Mammogram: 8/18/16 negative, scheduled for 9/17  DEXA: 8/13/15, osteopenia, scheduled for 9/17  Tdap: 2012  Pneumovax: declines  [id number]: declines  Zostavax: declines  Flu shot: declines  A1c:  ordered  Eye exam: Dr. Barbara Reddy 17, Dr. Jalen Walsh cataracts-pending 17  CT lung screen: 16 nodule in trachea  EK16 normal sinus   Hep C screen:  ordered  Lipids:  ,  ordered      Patient Active Problem List    Diagnosis Date Noted    Cardiac murmur 2017    Reactive depression 2016     Current Outpatient Prescriptions   Medication Sig Dispense Refill    hydroCHLOROthiazide (HYDRODIURIL) 25 mg tablet Take 1 Tab by mouth daily. 30 Tab 3    buPROPion XL (WELLBUTRIN XL) 150 mg tablet Take 1 Tab by mouth every morning. 90 Tab 2    latanoprost (XALATAN) 0.005 % ophthalmic solution       acetaminophen (TYLENOL) 325 mg tablet Take  by mouth every four (4) hours as needed for Pain.  losartan (COZAAR) 25 mg tablet Take 1 Tab by mouth daily. 30 Tab 1     Past Surgical History:   Procedure Laterality Date    HX GYN      Complete hysterectomy     HX HERNIA REPAIR        No results found for: WBC, WBCT, WBCPOC, HGB, HGBPOC, HCT, HCTPOC, PLT, PLTPOC, MCV, MCVPOC, HGBEXT, HCTEXT, PLTEXT  No results found for: CHOL, CHOLPOCT, HDL, LDL, LDLC, LDLCPOC, LDLCEXT, TRIGL, TGLPOCT, CHHD, CHHDX  No results found for: GFRNA, GFRNAPOC, GFRAA, GFRAAPOC, CREA, MCREA, CREAPOC, BUN, IBUN, BUNPOC, NA, NAPOC, K, KPOCT, CL, CLPOC, CO2, CO2POC, MG, PHOS, ALBEU, PTH, PTHILT       Review of Systems   Respiratory: Negative for shortness of breath and wheezing. Cardiovascular: Negative for chest pain, palpitations, orthopnea, claudication, leg swelling and PND. Physical Exam   Constitutional: She is oriented to person, place, and time. She appears well-developed and well-nourished. No distress. HENT:   Head: Normocephalic and atraumatic. Right Ear: External ear normal.   Left Ear: External ear normal.   Mouth/Throat: Oropharynx is clear and moist. No oropharyngeal exudate.    Eyes: Conjunctivae and EOM are normal. Right eye exhibits no discharge. Left eye exhibits no discharge. Neck: Normal range of motion. Neck supple. No carotid bruit   Cardiovascular: Normal rate, regular rhythm, normal heart sounds and intact distal pulses. Exam reveals no gallop and no friction rub. No murmur heard. Pulmonary/Chest: Effort normal and breath sounds normal. No respiratory distress. She has no wheezes. She has no rales. She exhibits no tenderness. Abdominal: Soft. She exhibits no distension and no mass. There is no tenderness. There is no rebound and no guarding. Musculoskeletal: Normal range of motion. She exhibits no edema, tenderness or deformity. Lymphadenopathy:     She has no cervical adenopathy. Neurological: She is alert and oriented to person, place, and time. Coordination normal.   Skin: Skin is warm and dry. No rash noted. She is not diaphoretic. No erythema. No pallor. Psychiatric: She has a normal mood and affect. Her behavior is normal.       ASSESSMENT and PLAN    ICD-10-CM ICD-9-CM    1. Preop exam for internal medicine    Pt is low risk for low risk surgery with good functional mets. EKG within the last year was nsr. She may proceed to cataract surgery without further cardiac evaluation   Z01.818 V72.83 CBC W/O DIFF   2. Essential hypertension    Now controlled on HCTZ, continue current dose. Discussed will need to check blood work today. I10 401.9    3. Reactive depression    Controlled on wellbutrin, continue current dose. F32.9 300.4           Written by Herb Gallardo, as dictated by Dereje Andrews MD.    Current diagnosis and concerns discussed with pt at length. Understands risks and benefits or current treatment plan and medications and accepts the treatment and medication with any possible risks.   Pt asks appropriate questions which were answered.   Pt instructed to call with any concerns or problems. This note will not be viewable in [address]

## 2017-08-09 LAB
ERYTHROCYTE [DISTWIDTH] IN BLOOD BY AUTOMATED COUNT: 13 % (ref 12.3–15.4)
HCT VFR BLD AUTO: 40.4 % (ref 34–46.6)
HGB BLD-MCNC: 13.6 G/DL (ref 11.1–15.9)
MCH RBC QN AUTO: 31.1 PG (ref 26.6–33)
MCHC RBC AUTO-ENTMCNC: 33.7 G/DL (ref 31.5–35.7)
MCV RBC AUTO: 92 FL (ref 79–97)
PLATELET # BLD AUTO: 294 X10E3/UL (ref 150–379)
RBC # BLD AUTO: 4.37 X10E6/UL (ref 3.77–5.28)
WBC # BLD AUTO: 9.4 X10E3/UL (ref 3.4–10.8)

## 2017-10-27 RX ORDER — HYDROCHLOROTHIAZIDE 25 MG/1
25 TABLET ORAL DAILY
Qty: 30 TAB | Refills: 3 | Status: SHIPPED | OUTPATIENT
Start: 2017-10-27 | End: 2018-03-08 | Stop reason: SDUPTHER

## 2017-10-27 NOTE — TELEPHONE ENCOUNTER
Requested Prescriptions     Pending Prescriptions Disp Refills    hydroCHLOROthiazide (HYDRODIURIL) 25 mg tablet 30 Tab 3     Sig: Take 1 Tab by mouth daily.          Last Office Visit: 8.8.17    Upcoming Appointment: 12.1.17

## 2017-12-22 ENCOUNTER — TELEPHONE (OUTPATIENT)
Dept: INTERNAL MEDICINE CLINIC | Age: 67
End: 2017-12-22

## 2017-12-22 RX ORDER — OSELTAMIVIR PHOSPHATE 75 MG/1
75 CAPSULE ORAL 2 TIMES DAILY
Qty: 10 CAP | Refills: 0 | Status: SHIPPED | OUTPATIENT
Start: 2017-12-22 | End: 2017-12-27

## 2017-12-22 RX ORDER — OSELTAMIVIR PHOSPHATE 75 MG/1
75 CAPSULE ORAL 2 TIMES DAILY
Qty: 10 CAP | Refills: 0 | Status: SHIPPED | OUTPATIENT
Start: 2017-12-22 | End: 2017-12-22 | Stop reason: SDUPTHER

## 2017-12-22 NOTE — TELEPHONE ENCOUNTER
Patient states she needs a call back in reference to getting a prescription for Tamiflu called into pharmacy as her Geni Foss just tested positive for the flu & she had 2 day exposure to him. Please call to discuss & advise.  Thank you

## 2017-12-22 NOTE — TELEPHONE ENCOUNTER
Called, spoke to pt. Two pt identifiers confirmed. Pt states she had grandson-babysitting and couldn't make the appt. Pt informed per Dr. Rosario silverio sent locally. Pt verbalized understanding of information discussed w/ no further questions at this time.

## 2017-12-22 NOTE — TELEPHONE ENCOUNTER
Pt has flu like symptoms , is requesting medication.  Possible 1 [employer] [phone]       Message received & copied from [facility]

## 2018-03-08 DIAGNOSIS — Z00.00 ANNUAL PHYSICAL EXAM: Primary | ICD-10-CM

## 2018-03-08 RX ORDER — BUPROPION HYDROCHLORIDE 150 MG/1
TABLET ORAL
Qty: 90 TAB | Refills: 0 | Status: SHIPPED | OUTPATIENT
Start: 2018-03-08 | End: 2018-05-16 | Stop reason: DRUGHIGH

## 2018-03-08 RX ORDER — HYDROCHLOROTHIAZIDE 25 MG/1
25 TABLET ORAL DAILY
Qty: 30 TAB | Refills: 3 | Status: SHIPPED | OUTPATIENT
Start: 2018-03-08 | End: 2018-06-17 | Stop reason: SDUPTHER

## 2018-03-08 NOTE — TELEPHONE ENCOUNTER
Called and spoke to pt. Two pt identifiers confirmed. Pt has already set up ov for 5/16/18 with Dr. Nahomy Orourke. Pt is requesting lab work to be ordered. Told pt I would request Dr. Nahomy Orourke to put labs in. Pt stated she would get labs done prior to ov. Pt also requesting med refills. Told pt refills would be sent to Dr. Nahomy Orourke for approval.  No further questions or concerns at time of call.

## 2018-04-23 DIAGNOSIS — Z13.220 SCREENING, LIPID: Primary | ICD-10-CM

## 2018-04-23 DIAGNOSIS — Z13.29 SCREENING FOR THYROID DISORDER: ICD-10-CM

## 2018-04-23 NOTE — PROGRESS NOTES
From     Baldemar Mckinley MD      To     Power Walker, LPN      Sent     2/87/5850  8:41 AM            In addition to march orders add lipids and tsh

## 2018-05-10 ENCOUNTER — APPOINTMENT (OUTPATIENT)
Dept: INTERNAL MEDICINE CLINIC | Age: 68
End: 2018-05-10

## 2018-05-11 LAB
ALBUMIN SERPL-MCNC: 4.1 G/DL (ref 3.6–4.8)
ALBUMIN/GLOB SERPL: 1.3 {RATIO} (ref 1.2–2.2)
ALP SERPL-CCNC: 70 IU/L (ref 39–117)
ALT SERPL-CCNC: 13 IU/L (ref 0–32)
AST SERPL-CCNC: 17 IU/L (ref 0–40)
BILIRUB SERPL-MCNC: 0.3 MG/DL (ref 0–1.2)
BUN SERPL-MCNC: 26 MG/DL (ref 8–27)
BUN/CREAT SERPL: 29 (ref 12–28)
CALCIUM SERPL-MCNC: 9.5 MG/DL (ref 8.7–10.3)
CHLORIDE SERPL-SCNC: 102 MMOL/L (ref 96–106)
CHOLEST SERPL-MCNC: 209 MG/DL (ref 100–199)
CO2 SERPL-SCNC: 26 MMOL/L (ref 18–29)
CREAT SERPL-MCNC: 0.89 MG/DL (ref 0.57–1)
EST. AVERAGE GLUCOSE BLD GHB EST-MCNC: 120 MG/DL
GFR SERPLBLD CREATININE-BSD FMLA CKD-EPI: 68 ML/MIN/1.73
GFR SERPLBLD CREATININE-BSD FMLA CKD-EPI: 78 ML/MIN/1.73
GLOBULIN SER CALC-MCNC: 3.2 G/DL (ref 1.5–4.5)
GLUCOSE SERPL-MCNC: 106 MG/DL (ref 65–99)
HBA1C MFR BLD: 5.8 % (ref 4.8–5.6)
HDLC SERPL-MCNC: 38 MG/DL
LDLC SERPL CALC-MCNC: 128 MG/DL (ref 0–99)
POTASSIUM SERPL-SCNC: 4.2 MMOL/L (ref 3.5–5.2)
PROT SERPL-MCNC: 7.3 G/DL (ref 6–8.5)
SODIUM SERPL-SCNC: 143 MMOL/L (ref 134–144)
TRIGL SERPL-MCNC: 213 MG/DL (ref 0–149)
TSH SERPL DL<=0.005 MIU/L-ACNC: 1.97 UIU/ML (ref 0.45–4.5)
VLDLC SERPL CALC-MCNC: 43 MG/DL (ref 5–40)

## 2018-05-16 ENCOUNTER — OFFICE VISIT (OUTPATIENT)
Dept: INTERNAL MEDICINE CLINIC | Age: 68
End: 2018-05-16

## 2018-05-16 VITALS
BODY MASS INDEX: 31.36 KG/M2 | RESPIRATION RATE: 16 BRPM | HEIGHT: 63 IN | HEART RATE: 60 BPM | OXYGEN SATURATION: 95 % | SYSTOLIC BLOOD PRESSURE: 131 MMHG | DIASTOLIC BLOOD PRESSURE: 76 MMHG | WEIGHT: 177 LBS | TEMPERATURE: 98.1 F

## 2018-05-16 DIAGNOSIS — Z87.891 PERSONAL HISTORY OF NICOTINE DEPENDENCE: ICD-10-CM

## 2018-05-16 DIAGNOSIS — Z00.00 MEDICARE ANNUAL WELLNESS VISIT, SUBSEQUENT: ICD-10-CM

## 2018-05-16 DIAGNOSIS — J30.1 SEASONAL ALLERGIC RHINITIS DUE TO POLLEN: ICD-10-CM

## 2018-05-16 DIAGNOSIS — E66.9 OBESITY (BMI 30.0-34.9): ICD-10-CM

## 2018-05-16 DIAGNOSIS — M85.89 OSTEOPENIA OF MULTIPLE SITES: ICD-10-CM

## 2018-05-16 DIAGNOSIS — F17.200 SMOKER: ICD-10-CM

## 2018-05-16 DIAGNOSIS — E78.00 PURE HYPERCHOLESTEROLEMIA: ICD-10-CM

## 2018-05-16 DIAGNOSIS — R73.01 IFG (IMPAIRED FASTING GLUCOSE): ICD-10-CM

## 2018-05-16 DIAGNOSIS — I10 ESSENTIAL HYPERTENSION: Primary | ICD-10-CM

## 2018-05-16 DIAGNOSIS — F32.9 REACTIVE DEPRESSION: ICD-10-CM

## 2018-05-16 RX ORDER — BUPROPION HYDROCHLORIDE 300 MG/1
300 TABLET ORAL
Qty: 90 TAB | Refills: 1 | Status: SHIPPED | OUTPATIENT
Start: 2018-05-16 | End: 2018-11-14 | Stop reason: SDUPTHER

## 2018-05-16 NOTE — MR AVS SNAPSHOT
[address] 
[phone] Patient: Jeny Blue MRN: [id number] SNC:[birth year] Visit Information Date & Time Provider Department Dept. Phone Encounter #  
 5/16/2018  2:45 PM Rosemarie Kelsey, [facility] [phone] [id number] Follow-up Instructions Return in about 6 months (around 11/16/2018). Upcoming Health Maintenance Date Due  
 MEDICARE YEARLY EXAM 6/1/2018 Influenza Age 5 to Adult 8/1/2018 Pneumococcal 65+ Low/Medium Risk (2 of 2 - PPSV23) 8/8/2018 COLONOSCOPY 7/14/2019 GLAUCOMA SCREENING Q2Y 8/3/2019 BREAST CANCER SCRN MAMMOGRAM 9/7/2019 DTaP/Tdap/Td series (2 - Td) 1/2/2022 Allergies as of 5/16/2018  Review Complete On: 8/8/2017 By: Rosemarie Kelsey MD  
  
 Severity Noted Reaction Type Reactions Erythromycin  12/22/2016    Other (comments) Stomach cramps Morphine  12/22/2016    Itching Current Immunizations  Never Reviewed No immunizations on file. Not reviewed this visit You Were Diagnosed With   
  
 Codes Comments Essential hypertension    -  Primary ICD-10-CM: I10 
ICD-9-CM: 401.9 Personal history of nicotine dependence     ICD-10-CM: J45.429 ICD-9-CM: V15.82 Reactive depression     ICD-10-CM: F32.9 ICD-9-CM: 300.4 Smoker     ICD-10-CM: B40.287 ICD-9-CM: 305.1 Pure hypercholesterolemia     ICD-10-CM: E78.00 ICD-9-CM: 272.0 IFG (impaired fasting glucose)     ICD-10-CM: R73.01 
ICD-9-CM: 790.21 Obesity (BMI 30.0-34.9)     ICD-10-CM: Y21.6 ICD-9-CM: 278.00 Medicare annual wellness visit, subsequent     ICD-10-CM: Z00.00 ICD-9-CM: V70.0 Osteopenia of multiple sites     ICD-10-CM: M85.89 ICD-9-CM: 733.90 Seasonal allergic rhinitis due to pollen     ICD-10-CM: J30.1 ICD-9-CM: 477.0 Vitals BP Pulse Temp Resp Height(growth percentile) Weight(growth percentile) 131/76 (BP 1 Location: Left arm, BP Patient Position: Sitting) 60 98.1 °F (36.7 °C) (Oral) 16 5' 3\" (1.6 m) 177 lb (80.3 kg) SpO2 BMI OB Status Smoking Status 95% 31.35 kg/m2 Hysterectomy Current Every Day Smoker Vitals History BMI and BSA Data Body Mass Index Body Surface Area  
 31.35 kg/m 2 1.89 m 2 Preferred Pharmacy Pharmacy Name Phone [facility] [address] [place] [address] [address] [facility] [address] [phone] Your Updated Medication List  
  
   
This list is accurate as of 5/16/18  2:46 PM.  Always use your most recent med list.  
  
  
  
  
 buPROPion  mg XL tablet Commonly known as:  [place] Take 1 Tab by mouth every morning. hydroCHLOROthiazide 25 mg tablet Commonly known as:  HYDRODIURIL Take 1 Tab by mouth daily. latanoprost 0.005 % ophthalmic solution Commonly known as:  XALATAN  
  
 TYLENOL 325 mg tablet Generic drug:  acetaminophen Take  by mouth every four (4) hours as needed for Pain. Prescriptions Sent to Pharmacy Refills buPROPion XL (WELLBUTRIN XL) 300 mg XL tablet 1 Sig: Take 1 Tab by mouth every morning. Class: Normal  
 Pharmacy: [address] [place] [address], [address] [place] [address] #: [phone] Route: Oral  
  
Follow-up Instructions Return in about 6 months (around 11/16/2018). To-Do List   
 05/16/2018 Imaging:  CT LOW DOSE LUNG CANCER SCREENING Referral Information Referral ID Referred By Referred To  
  
 [id number] Randy Mercado Not Available Visits Status Start Date End Date 1 New Request 5/16/18 5/16/19 If your referral has a status of pending review or denied, additional information will be sent to support the outcome of this decision. Patient Instructions Medicare Part B Preventive Services Guidelines/Limitations Date last completed and Frequency Due Date Bone Mass Measurement (age 72 & older, biennial) Requires diagnosis related to osteoporosis or estrogen deficiency. Biennial benefit unless patient has history of long-term glucocorticoid tx or baseline is needed because initial test was by other method Completed 9/7/17 
   
Recommended every 2 years Due 9/2019 Cardiovascular Screening Blood Tests (every 5 years) Total cholesterol, HDL, Triglycerides Order as a panel if possible Completed 5/2018 Recommended annually Due 5/2019 Colorectal Cancer Screening 
-Fecal occult blood test (annual) -Flexible sigmoidoscopy (5y) 
-Screening colonoscopy (10y) -Barium Enema    Completed 4/2016 with Dr. Aditya Krause Recommended every 5 years  Due 4/2021 Counseling to Prevent Tobacco Use (up to 8 sessions per year) - Counseling greater than 3 and up to 10 minutes - Counseling greater than 10 minutes Patients must be asymptomatic of tobacco-related conditions to receive as preventive service Current smoker Please call [phone] Diabetes Screening Tests (at least every 3 years, Medicare covers annually or at 6-month intervals for prediabetic patients) 
   
Fasting blood sugar (FBS) or glucose tolerance test (GTT) Patient must be diagnosed with one of the following: 
-Hypertension, Dyslipidemia, obesity, previous impaired FBS or GTT 
Or any two of the following: overweight, FH of diabetes, age ? 72, history of gestational diabetes, birth of baby weighing more than 9 pounds Completed 5/2018 with A1C 5.8 
   
Recommended every 3-6 months for Pre-Diabetics and Diabetics Due 8/2018-11/2018 Diabetes Self-Management Training (DSMT) (no USPSTF recommendation) Requires referral by treating physician for patient with diabetes or renal disease. 10 hours of initial DSMT session of no less than 30 minutes each in a continuous 12-month period. 2 hours of follow-up DSMT in subsequent years. N/A N/A Glaucoma Screening (no USPSTF recommendation) Diabetes mellitus, family history, , age 48 or over,  American, age 72 or over Completed 4/2018 
   
Recommended annually Due 4/2019 Human Immunodeficiency Virus (HIV) Screening (annually for increased risk patients) HIV-1 and HIV-2 by EIA, NATALIE, rapid antibody test, or oral mucosa transudate Patient must be at increased risk for HIV infection per USPSTF guidelines or pregnant. Tests covered annually for patients at increased risk. Pregnant patients may receive up to 3 test during pregnancy. N/A N/A Medical Nutrition Therapy (MNT) (for diabetes or renal disease not recommended schedule) Requires referral by treating physician for patient with diabetes or renal disease. Can be provided in same year as diabetes self-management training (DSMT), and CMS recommends medical nutrition therapy take place after DSMT. Up to 3 hours for initial year and 2 hours in subsequent years. N/A N/A Prostate Cancer Screening (annually up to age 76) - Digital rectal exam (TELLO) - Prostate specific antigen (PSA) Annually (age 48 or over), TELLO not paid separately when covered E/M service is provided on same date N/A N/A Seasonal Influenza Vaccination (annually)    Never completed Recommended annually Declines Pneumococcal Vaccination (once after 65)    Pneumococcal 23 - never completed Prevnar 13 - never completed Both recommended once over the age of 72 Declines Declines Hepatitis B Vaccinations (if medium/high risk) Medium/high risk factors: End-stage renal disease, Hemophiliacs who received Factor VIII or IX concentrates, Clients of institutions for the mentally retarded, Persons who live in the same house as a HepB virus carrier, Homosexual men, Illicit injectable drug abusers. N/A N/A Screening Mammography (biennial age 54-69)? Annually (age 36 or over) Completed 9/2017 Recommended annually  Due 9/2018 Screening Pap Tests and Pelvic Examination (up to age 79 and after 79 if unknown history or abnormal study last 10 years) Every 24 months except high risk Completed 9/2017 Recommended every 2 years Due 9/2019 Ultrasound Screening for Abdominal Aortic Aneurysm (AAA) (once) Patient must be referred through IPPE and not have had a screening for abdominal aortic aneurysm before under Medicare. Limited to patients who meet one of the following criteria: 
- Men who are 73-68 years old and have smoked more than 100 cigarettes in their lifetime. 
-Anyone with a FH of AAA 
-Anyone recommended for screening by USPSTF n/a n/a Medicare Wellness Visit, Female The best way to live healthy is to have a lifestyle where you eat a well-balanced diet, exercise regularly, limit alcohol use, and quit all forms of tobacco/nicotine, if applicable. Regular preventive services are another way to keep healthy. Preventive services (vaccines, screening tests, monitoring & exams) can help personalize your care plan, which helps you manage your own care. Screening tests can find health problems at the earliest stages, when they are easiest to treat. [facility] follows the current, evidence-based guidelines published by the [facility] Ajay Cardozo (USPSTF) when recommending preventive services for our patients. Because we follow these guidelines, sometimes recommendations change over time as research supports it. (For example, mammograms used to be recommended annually. Even though Medicare will still pay for an annual mammogram, the newer guidelines recommend a mammogram every two years for women of average risk.) Of course, you and your provider may decide to screen more often for some diseases, based on your risk and co-morbidities (chronic disease you are already diagnosed with). Preventive services for you include: - Medicare offers their members a free annual wellness visit, which is time for you and your primary care provider to discuss and plan for your preventive service needs. Take advantage of this benefit every year! 
 
-All people over age 72 should receive the recommended pneumonia vaccines. Current USPSTF guidelines recommend a series of two vaccines for the best pneumonia protection.  
 
-All adults should have a yearly flu vaccine and a tetanus vaccine every 10 years. All adults age 61 years should receive a shingles vaccine once in their lifetime.   
 
-A bone mass density test is recommended when a woman turns 65 to screen for osteoporosis. This test is only recommended once as a screening. Some providers will use this same test as a disease monitoring tool if you already have osteoporosis. -All adults age 38-68 years who are overweight should have a diabetes screening test once every three years.  
 
-Other screening tests & preventive services for persons with diabetes include: an eye exam to screen for diabetic retinopathy, a kidney function test, a foot exam, and stricter control over your cholesterol.  
 
-Cardiovascular screening for adults with routine risk involves an electrocardiogram (ECG) at intervals determined by the provider.  
 
-Colorectal cancer screenings should be done for adults age 54-65 years with normal risk. There are a number of acceptable methods of screening for this type of cancer. Each test has its own benefits and drawbacks. Discuss with your provider what is most appropriate for you during your annual wellness visit. The different tests include: colonoscopy (considered the best screening method), a fecal occult blood test, a fecal DNA test, and sigmoidoscopy.  
 
-Breast cancer screenings are recommended every other year for women of normal risk age 54-69 years.  
 
-Cervical cancer screenings for women over age 72 are only recommended with certain risk factors.  
 
-All adults born between [facility] should be screened once for Hepatitis C.  
 
 Here is a list of your current Health Maintenance items (your personalized list of preventive services) with a due date: There are no preventive care reminders to display for this patient. Take daily aspirin 81mg per day Zyrtec and flonase over the counter for allergies Introducing \[facility]\"" & HEALTH SERVICES! Dear Ezequiel Chou: 
Thank you for requesting a [employer] account. Our records indicate that you already have an active [employer] account. You can access your account anytime at [email or web address]. [email or web address] Did you know that you can access your hospital and ER discharge instructions at any time in [employer]? You can also review all of your test results from your hospital stay or ER visit. Additional Information If you have questions, please visit the Frequently Asked Questions section of the [employer] website at [email or web address] Remember, [employer] is NOT to be used for urgent needs. For medical emergencies, dial 911. Now available from your iPhone and Android! Please provide this summary of care documentation to your next provider. Your primary care clinician is listed as Addie Edwards. If you have any questions after today's visit, please call [phone].

## 2018-05-16 NOTE — PROGRESS NOTES
This is the Subsequent Medicare Annual Wellness Exam, performed 12 months or more after the Initial AWV or the last Subsequent AWV    I have reviewed the patient's medical history in detail and updated the computerized patient record. History   History reviewed. No pertinent past medical history. Past Surgical History:   Procedure Laterality Date    HX GYN      Complete hysterectomy 1993    HX HERNIA REPAIR  1982     Current Outpatient Prescriptions   Medication Sig Dispense Refill    buPROPion XL (WELLBUTRIN XL) 150 mg tablet TAKE 1 TABLET EVERY MORNING 90 Tab 0    hydroCHLOROthiazide (HYDRODIURIL) 25 mg tablet Take 1 Tab by mouth daily. 30 Tab 3    acetaminophen (TYLENOL) 325 mg tablet Take  by mouth every four (4) hours as needed for Pain.  latanoprost (XALATAN) 0.005 % ophthalmic solution        Allergies   Allergen Reactions    Erythromycin Other (comments)     Stomach cramps    Morphine Itching     Family History   Problem Relation Age of Onset    Hypertension Mother     Diabetes Father     Diabetes Sister     Diabetes Brother     Hypertension Brother      Social History   Substance Use Topics    Smoking status: Current Every Day Smoker     Packs/day: 1.50    Smokeless tobacco: Never Used    Alcohol use No     Patient Active Problem List   Diagnosis Code    Reactive depression F32.9    Cardiac murmur R01.1    Essential hypertension I10       Depression Risk Factor Screening:     PHQ over the last two weeks 5/16/2018   Little interest or pleasure in doing things Not at all   Feeling down, depressed or hopeless Not at all   Total Score PHQ 2 0     Alcohol Risk Factor Screening: You do not drink alcohol or very rarely. Functional Ability and Level of Safety:   Hearing Loss  Hearing is good. Activities of Daily Living  The home contains: no safety equipment. Patient does total self care    Fall Risk  Fall Risk Assessment, last 12 mths 5/16/2018   Able to walk?  Yes   Fall in past 12 months? No       Abuse Screen  Patient is not abused  Lives with     Safe happy   Cognitive Screening   Evaluation of Cognitive Function:  Has your family/caregiver stated any concerns about your memory: no  Normal    Patient Care Team   Patient Care Team:  Stu Stevens MD as PCP - General (Internal Medicine)  Gisella Fajardo MD (Otolaryngology)  Mile Bonilla MD (Colon and Rectal Surgery)  justina (Ophthalmology)  Jay Jay Banerjee MD (Ophthalmology)  Asher Lee NP (Obstetrics & Gynecology)   Dermatology dr suh --[facility]    Updated list    Assessment/Plan   Education and counseling provided:  Are appropriate based on today's review and evaluation  End-of-Life planning (with patient's consent)  Cardiovascular screening blood test  Diabetes screening test    Diagnoses and all orders for this visit:    1. Personal history of nicotine dependence  -     CT LOW DOSE LUNG CANCER SCREENING; Future    2. Reactive depression    3. Smoker    4. Essential hypertension    5. Pure hypercholesterolemia    6. IFG (impaired fasting glucose)    7. Obesity (BMI 30.0-34.9)    8. Medicare annual wellness visit, subsequent        There are no preventive care reminders to display for this patient. Discussed with patient about advance medical directive. Provided patient blank AMD and Your Right to Decide Booklet. Requested that if completed to provide a copy of AMD to office. ACP not on file. SDM is her . Provided information today.        Colonoscopy: 4/16, Dr. Yan Downing, repeat 5 years, will get report from Deisy Aguilera?   AAA: not needed, denies fmhx  Pap: Leeann Lee, 9/7/17  Mammogram: 9/7/17, will get report for review  DEXA: 9/7/17, nl, mildly osteopenic in L hip, low FRAX score    Tdap: 2012  Pneumovax: declines  [id number]: declines  Zostavax: declines  Flu shot: declines    Eye exam: Dr. Barbara Reddy 4/19/18, annual         CT lung cancer screen: 12/27/16, nodule in trachea, due  EKG: 12/22/16, nsr     Hep C screen: 8/17, negative   A1c:  5/18 5.8 q6 months   Lipids: 5/18   Annually   Medication reconciliation completed by MA and reviewed by me. Medical/surgical/social/family history reviewed and updated by me. Patient provided AVS and preventative screening table. Patient verbalized understanding of all information discussed.

## 2018-05-16 NOTE — PATIENT INSTRUCTIONS
Medicare Part B Preventive Services Guidelines/Limitations Date last completed and Frequency Due Date   Bone Mass Measurement  (age 72 & older, biennial) Requires diagnosis related to osteoporosis or estrogen deficiency. Biennial benefit unless patient has history of long-term glucocorticoid tx or baseline is needed because initial test was by other method Completed 9/7/17      Recommended every 2 years Due 9/2019   Cardiovascular Screening Blood Tests (every 5 years)  Total cholesterol, HDL, Triglycerides Order as a panel if possible Completed 5/2018    Recommended annually Due 5/2019   Colorectal Cancer Screening  -Fecal occult blood test (annual)  -Flexible sigmoidoscopy (5y)  -Screening colonoscopy (10y)  -Barium Enema    Completed 4/2016 with Dr. Aditya Krause    Recommended every 5 years  Due 4/2021    Counseling to Prevent Tobacco Use (up to 8 sessions per year)  - Counseling greater than 3 and up to 10 minutes  - Counseling greater than 10 minutes Patients must be asymptomatic of tobacco-related conditions to receive as preventive service Current smoker Please call [phone]   Diabetes Screening Tests (at least every 3 years, Medicare covers annually or at 6-month intervals for prediabetic patients)      Fasting blood sugar (FBS) or glucose tolerance test (GTT) Patient must be diagnosed with one of the following:  -Hypertension, Dyslipidemia, obesity, previous impaired FBS or GTT  Or any two of the following: overweight, FH of diabetes, age ? 72, history of gestational diabetes, birth of baby weighing more than 9 pounds Completed 5/2018 with A1C 5.8      Recommended every 3-6 months for Pre-Diabetics and Diabetics Due 8/2018-11/2018   Diabetes Self-Management Training (DSMT) (no USPSTF recommendation) Requires referral by treating physician for patient with diabetes or renal disease. 10 hours of initial DSMT session of no less than 30 minutes each in a continuous 12-month period.  2 hours of follow-up DSMT in subsequent years. N/A N/A   Glaucoma Screening (no USPSTF recommendation) Diabetes mellitus, family history, , age 48 or over,  American, age 72 or over Completed 4/2018      Recommended annually Due 4/2019   Human Immunodeficiency Virus (HIV) Screening (annually for increased risk patients)  HIV-1 and HIV-2 by EIA, NATALIE, rapid antibody test, or oral mucosa transudate Patient must be at increased risk for HIV infection per USPSTF guidelines or pregnant. Tests covered annually for patients at increased risk. Pregnant patients may receive up to 3 test during pregnancy. N/A N/A   Medical Nutrition Therapy (MNT) (for diabetes or renal disease not recommended schedule) Requires referral by treating physician for patient with diabetes or renal disease. Can be provided in same year as diabetes self-management training (DSMT), and CMS recommends medical nutrition therapy take place after DSMT. Up to 3 hours for initial year and 2 hours in subsequent years. N/A N/A   Prostate Cancer Screening (annually up to age 76)  - Digital rectal exam (TELLO)  - Prostate specific antigen (PSA) Annually (age 48 or over), TELLO not paid separately when covered E/M service is provided on same date N/A N/A   Seasonal Influenza Vaccination (annually)    Never completed    Recommended annually Declines    Pneumococcal Vaccination (once after 72)    Pneumococcal 21 - never completed     Prevnar 15 - never completed      Both recommended once over the age of 72 Declines      Declines    Hepatitis B Vaccinations (if medium/high risk) Medium/high risk factors: End-stage renal disease,  Hemophiliacs who received Factor VIII or IX concentrates, Clients of institutions for the mentally retarded, Persons who live in the same house as a HepB virus carrier, Homosexual men, Illicit injectable drug abusers. N/A N/A   Screening Mammography (biennial age 54-69)?  Annually (age 36 or over) Completed 9/2017    Recommended annually  Due 9/2018   Screening Pap Tests and Pelvic Examination (up to age 79 and after 79 if unknown history or abnormal study last 10 years) Every 25 months except high risk Completed 9/2017    Recommended every 2 years Due 9/2019   Ultrasound Screening for Abdominal Aortic Aneurysm (AAA) (once) Patient must be referred through [facility] and not have had a screening for abdominal aortic aneurysm before under Medicare. Limited to patients who meet one of the following criteria:  - Men who are 73-68 years old and have smoked more than 100 cigarettes in their lifetime.  -Anyone with a FH of AAA  -Anyone recommended for screening by USPSTF n/a n/a         Medicare Wellness Visit, Female    The best way to live healthy is to have a lifestyle where you eat a well-balanced diet, exercise regularly, limit alcohol use, and quit all forms of tobacco/nicotine, if applicable. Regular preventive services are another way to keep healthy. Preventive services (vaccines, screening tests, monitoring & exams) can help personalize your care plan, which helps you manage your own care. Screening tests can find health problems at the earliest stages, when they are easiest to treat. [address] [facility] follows the current, evidence-based guidelines published by the [facility] Ajay Cardozo (USPSTF) when recommending preventive services for our patients. Because we follow these guidelines, sometimes recommendations change over time as research supports it. (For example, mammograms used to be recommended annually. Even though Medicare will still pay for an annual mammogram, the newer guidelines recommend a mammogram every two years for women of average risk.)    Of course, you and your provider may decide to screen more often for some diseases, based on your risk and co-morbidities (chronic disease you are already diagnosed with).      Preventive services for you include:    - Medicare offers their members a free annual wellness visit, which is time for you and your primary care provider to discuss and plan for your preventive service needs. Take advantage of this benefit every year!    -All people over age 72 should receive the recommended pneumonia vaccines. Current USPSTF guidelines recommend a series of two vaccines for the best pneumonia protection.     -All adults should have a yearly flu vaccine and a tetanus vaccine every 10 years. All adults age 61 years should receive a shingles vaccine once in their lifetime.      -A bone mass density test is recommended when a woman turns 65 to screen for osteoporosis. This test is only recommended once as a screening. Some providers will use this same test as a disease monitoring tool if you already have osteoporosis. -All adults age 38-68 years who are overweight should have a diabetes screening test once every three years.     -Other screening tests & preventive services for persons with diabetes include: an eye exam to screen for diabetic retinopathy, a kidney function test, a foot exam, and stricter control over your cholesterol.     -Cardiovascular screening for adults with routine risk involves an electrocardiogram (ECG) at intervals determined by the provider.     -Colorectal cancer screenings should be done for adults age 54-65 years with normal risk. There are a number of acceptable methods of screening for this type of cancer. Each test has its own benefits and drawbacks. Discuss with your provider what is most appropriate for you during your annual wellness visit. The different tests include: colonoscopy (considered the best screening method), a fecal occult blood test, a fecal DNA test, and sigmoidoscopy.     -Breast cancer screenings are recommended every other year for women of normal risk age 54-69 years.     -Cervical cancer screenings for women over age 72 are only recommended with certain risk factors.     -All adults born between 80 and 1965 should be screened once for Hepatitis C. Here is a list of your current Health Maintenance items (your personalized list of preventive services) with a due date: There are no preventive care reminders to display for this patient.      Take daily aspirin 81mg per day     Zyrtec and flonase over the counter for allergies

## 2018-05-16 NOTE — PROGRESS NOTES
HISTORY OF PRESENT ILLNESS  Christa Mann is a 79 y.o. female. HPI   Last here 8/8/17.  Pt is here for routine care.     BP today is 131/76  BP was 111/72 at the dentist's office  Continues HCTZ 12.5mg daily      Wt is up 7 lbs x lov   Increased dose of wellbutrin should assist with w/l   She used to take diet pills in the past  She drinks [employer] vanilla iced coffees   Discussed decreasing caloric beverage and increasing water intake    Discussed enrolling in [employer]      Reviewed last labs 5/18    Pt is not amenable to starting a statin at this time  Discussed diet and w/l   Advised her to take ASA 81mg daily      Reviewed DEXA 9/7/17: nl, mildly osteopenic in L hip, low FRAX score  Advised her to take vit D OTC daily     Pt follows with Dr. Mirza Wallace (ENT)  Reviewed notes 1/13/17: CT nl, nothing to do    Pt follows with Dr. Romain Tariq (derm) near [employer]  Last visit was recently - will get notes for review   Pt had a mole removed from her back at that time  Pt c/o bump on her R cheek x quite some time  Pt applied vinegar to this area, which caused a \"rug burn\" appearance to form  Pt completed a bx and was told that this spot was pre-cancerous    Pt will have this spot removed on 5/29/18    Pt c/o environmental allergies  On exam, pt had fluid behind her BL TM  Advised using flonase OTC qAM and zyrtec qhs     Continues xalatan daily      Continues wellbutrin 150mg daily for depression, which works well, happy with dose  This is supposed to be helping her with smoking cessation, as well   Will increase wellbutrin to 300mg daily to help primarily with smoking cessation      Pt has a 40 pack year hx  Pt continues to smoke and is not ready to quit  Pt smoke 1 PPD  Increased dose of wellbutrin should assist with smoking cessation   Counseled her on smoking cessation  Recall chantix caused itching in the past.     Pt does not drink any alcohol    Pt is independent (pt can drive/feed/bathe etc. herself)    Pt lives and gets along well with her     ACP not on file. SDM is her . Provided information today.      PREVENTIVE:  Colonoscopy: , Dr. Neto Lamb, repeat 5 years, will get report from [facility]? AAA: not needed, denies fmhx  Pap: Leeann Lee, 17  Mammogram: 17, will get report for review  DEXA: 17, nl, mildly osteopenic in L hip, low FRAX score  Tdap:   Pneumovax: declines  [id number]: declines  Zostavax: declines  Flu shot: declines  A1c:  5.7,  5.8  Eye exam: Dr. Graciela Garcia 18, Dr. Amanda Coreas, cataract surgs on 17  Lipids:    CT lung cancer screen: 16, nodule in trachea, due  EK16, nsr   Hep C screen: , negative     Patient Active Problem List    Diagnosis Date Noted    Cardiac murmur 2017    Reactive depression 2016     Current Outpatient Prescriptions   Medication Sig Dispense Refill    buPROPion XL (WELLBUTRIN XL) 150 mg tablet TAKE 1 TABLET EVERY MORNING 90 Tab 0    hydroCHLOROthiazide (HYDRODIURIL) 25 mg tablet Take 1 Tab by mouth daily. 30 Tab 3    acetaminophen (TYLENOL) 325 mg tablet Take  by mouth every four (4) hours as needed for Pain.       latanoprost (XALATAN) 0.005 % ophthalmic solution        Past Surgical History:   Procedure Laterality Date    HX GYN      Complete hysterectomy     HX HERNIA REPAIR        Lab Results  Component Value Date/Time   WBC 9.4 2017 10:57 AM   HGB 13.6 2017 10:57 AM   HCT 40.4 2017 10:57 AM   PLATELET 944  10:57 AM   MCV 92 2017 10:57 AM     Lab Results  Component Value Date/Time   Cholesterol, total 209 (H) 05/10/2018 08:44 AM   HDL Cholesterol 38 (L) 05/10/2018 08:44 AM   LDL, calculated 128 (H) 05/10/2018 08:44 AM   Triglyceride 213 (H) 05/10/2018 08:44 AM     Lab Results  Component Value Date/Time   GFR est non-AA 68 05/10/2018 08:45 AM   GFR est AA 78 05/10/2018 08:45 AM   Creatinine 0.89 05/10/2018 08:45 AM   BUN 26 05/10/2018 08:45 AM   Sodium 143 05/10/2018 08:45 AM   Potassium 4.2 05/10/2018 08:45 AM   Chloride 102 05/10/2018 08:45 AM   CO2 26 05/10/2018 08:45 AM        Review of Systems   HENT: Negative for hearing loss. Respiratory: Negative for shortness of breath. Cardiovascular: Negative for chest pain. Musculoskeletal: Negative for falls. Endo/Heme/Allergies: Positive for environmental allergies. Psychiatric/Behavioral: Negative for memory loss. Physical Exam   Constitutional: She is oriented to person, place, and time. She appears well-developed and well-nourished. No distress. HENT:   Head: Normocephalic and atraumatic. Right Ear: External ear normal.   Left Ear: External ear normal.   Mouth/Throat: Oropharynx is clear and moist. No oropharyngeal exudate. Fluid behind BL TM   Eyes: Conjunctivae and EOM are normal. Pupils are equal, round, and reactive to light. Right eye exhibits no discharge. Left eye exhibits no discharge. No scleral icterus. Neck: Normal range of motion. Neck supple. No carotid bruits    Cardiovascular: Normal rate, regular rhythm, normal heart sounds and intact distal pulses. Exam reveals no gallop and no friction rub. No murmur heard. Pulmonary/Chest: Effort normal and breath sounds normal. No respiratory distress. She has no wheezes. She has no rales. She exhibits no tenderness. Abdominal: Soft. She exhibits no distension and no mass. There is no tenderness. There is no rebound and no guarding. Musculoskeletal: Normal range of motion. She exhibits no edema, tenderness or deformity. Lymphadenopathy:     She has no cervical adenopathy. Neurological: She is alert and oriented to person, place, and time. Coordination normal.   Skin: Skin is warm and dry. No rash noted. She is not diaphoretic. No erythema. No pallor. Psychiatric: She has a normal mood and affect. Her behavior is normal.       ASSESSMENT and PLAN    ICD-10-CM ICD-9-CM    1.  Essential hypertension    Controled on HCTZ, continue    I10 401.9    2. Personal history of nicotine dependence    Will increase wellbutrin to 300 and see if this is more effective to help her wit cessation, tried and failed chantix in the past   Z87.891 V15.82 CT LOW DOSE LUNG CANCER SCREENING   3. Reactive depression    Controled on wellbutrin, will be increasing dose primarily to assist with smoking cessation    F32.9 300.4    4. Smoker    See above   F17.200 305.1    5. Pure hypercholesterolemia    Discussed diet and w/l, lipids up from lov, she is not interested in starting a statin just yet, advised starting an ASA daily given that she is a smoker    E78.00 272.0    6. IFG (impaired fasting glucose)    a1c was stable, continue with diet   R73.01 790.21    7. Obesity (BMI 30.0-34. 9)    Pt struggles with w/l, discussed WW, she needs to focus more aggressively on portion control    E66.9 278.00    8. Medicare annual wellness visit, subsequent Z00.00 V70.0    9. Osteopenia of multiple sites    Vit D for tx, DEXA UTD   M85.89 733.90    10. Seasonal allergic rhinitis due to pollen    Zyrtec and flonase OTC   J30.1 477.0         Scribed by [address], as dictated by Dr. Carter Rodriguez. Current diagnosis and concerns discussed with pt at length. Pt understands risks and benefits or current treatment plan and medications, and accepts the treatment and medication with any possible risks. Pt asks appropriate questions, which were answered. Pt was instructed to call with any concerns or problems. This note will not be viewable in [address]

## 2018-06-18 RX ORDER — HYDROCHLOROTHIAZIDE 25 MG/1
25 TABLET ORAL DAILY
Qty: 30 TAB | Refills: 3 | Status: SHIPPED | OUTPATIENT
Start: 2018-06-18 | End: 2018-11-20 | Stop reason: SDUPTHER

## 2018-06-18 NOTE — TELEPHONE ENCOUNTER
PCP: More Monsivais MD    Last appt: 5/16/2018  Future Appointments  Date Time Provider Brenda Calloway   11/7/2018 9:30 AM BSI CT 1 [facility]   11/20/2018 8:15 AM More Monsivais MD [facility] 87       Requested Prescriptions     Pending Prescriptions Disp Refills    hydroCHLOROthiazide (HYDRODIURIL) 25 mg tablet 30 Tab 3     Sig: Take 1 Tab by mouth daily.

## 2018-06-18 NOTE — TELEPHONE ENCOUNTER
From: Ismael Molina  To: Ariel Thompson MD  Sent: 6/17/2018 12:46 PM EDT  Subject: Medication Renewal Request    Original authorizing provider: MD Ismael Senior would like a refill of the following medications:  hydroCHLOROthiazide (HYDRODIURIL) 25 mg tablet Ariel Thompson MD]    Preferred pharmacy: [address] [place] [address], [address]    Comment:  Could you please refill hydrochlorothiazide it show Clorinda Pili approved on Mar 8 2012 but I did not received them from [place].  Thank You

## 2018-11-01 DIAGNOSIS — R73.01 IFG (IMPAIRED FASTING GLUCOSE): ICD-10-CM

## 2018-11-01 DIAGNOSIS — I10 ESSENTIAL HYPERTENSION: Primary | ICD-10-CM

## 2018-11-01 NOTE — PROGRESS NOTES
From  Caryl Downs MD To  Romina Lee, LPN Sent  39/9/2828 21:89 AM   A1c, cbc, Ирина [place], labs ordered.

## 2018-11-14 RX ORDER — BUPROPION HYDROCHLORIDE 300 MG/1
TABLET ORAL
Qty: 90 TAB | Refills: 1 | Status: SHIPPED | OUTPATIENT
Start: 2018-11-14 | End: 2019-03-22 | Stop reason: SDUPTHER

## 2018-11-15 ENCOUNTER — APPOINTMENT (OUTPATIENT)
Dept: INTERNAL MEDICINE CLINIC | Age: 68
End: 2018-11-15

## 2018-11-16 LAB
BUN SERPL-MCNC: 24 MG/DL (ref 8–27)
BUN/CREAT SERPL: 26 (ref 12–28)
CALCIUM SERPL-MCNC: 10.1 MG/DL (ref 8.7–10.3)
CHLORIDE SERPL-SCNC: 102 MMOL/L (ref 96–106)
CO2 SERPL-SCNC: 29 MMOL/L (ref 20–29)
CREAT SERPL-MCNC: 0.93 MG/DL (ref 0.57–1)
ERYTHROCYTE [DISTWIDTH] IN BLOOD BY AUTOMATED COUNT: 12.7 % (ref 12.3–15.4)
EST. AVERAGE GLUCOSE BLD GHB EST-MCNC: 111 MG/DL
GLUCOSE SERPL-MCNC: 107 MG/DL (ref 65–99)
HBA1C MFR BLD: 5.5 % (ref 4.8–5.6)
HCT VFR BLD AUTO: 43.3 % (ref 34–46.6)
HGB BLD-MCNC: 14.1 G/DL (ref 11.1–15.9)
MCH RBC QN AUTO: 31.3 PG (ref 26.6–33)
MCHC RBC AUTO-ENTMCNC: 32.6 G/DL (ref 31.5–35.7)
MCV RBC AUTO: 96 FL (ref 79–97)
PLATELET # BLD AUTO: 290 X10E3/UL (ref 150–379)
POTASSIUM SERPL-SCNC: 4.9 MMOL/L (ref 3.5–5.2)
RBC # BLD AUTO: 4.51 X10E6/UL (ref 3.77–5.28)
SODIUM SERPL-SCNC: 146 MMOL/L (ref 134–144)
WBC # BLD AUTO: 8.1 X10E3/UL (ref 3.4–10.8)

## 2018-11-20 ENCOUNTER — OFFICE VISIT (OUTPATIENT)
Dept: INTERNAL MEDICINE CLINIC | Age: 68
End: 2018-11-20

## 2018-11-20 VITALS
HEIGHT: 63 IN | RESPIRATION RATE: 16 BRPM | OXYGEN SATURATION: 96 % | DIASTOLIC BLOOD PRESSURE: 70 MMHG | HEART RATE: 58 BPM | TEMPERATURE: 98.1 F | BODY MASS INDEX: 28.35 KG/M2 | SYSTOLIC BLOOD PRESSURE: 115 MMHG | WEIGHT: 160 LBS

## 2018-11-20 DIAGNOSIS — I10 ESSENTIAL HYPERTENSION: Primary | ICD-10-CM

## 2018-11-20 DIAGNOSIS — E66.3 OVERWEIGHT: ICD-10-CM

## 2018-11-20 DIAGNOSIS — R73.01 IFG (IMPAIRED FASTING GLUCOSE): ICD-10-CM

## 2018-11-20 DIAGNOSIS — F32.9 REACTIVE DEPRESSION: ICD-10-CM

## 2018-11-20 RX ORDER — HYDROCHLOROTHIAZIDE 25 MG/1
25 TABLET ORAL DAILY
Qty: 90 TAB | Refills: 1 | Status: SHIPPED | OUTPATIENT
Start: 2018-11-20 | End: 2019-11-19 | Stop reason: SDUPTHER

## 2018-11-20 NOTE — PROGRESS NOTES
HISTORY OF PRESENT ILLNESS Ilya Baez is a 79 y.o. female. HPI Last here 5/16/18. Pt is here for routine care. 
  
BP today is 131/76 SBPs of 90s, 140, 110/70 Pt has been taking a full tab of HCTZ 25mg, not a half a tab Pt thinks she could have missed a few doses However she took these meds this AM 
 
Wt today is 160 lbs --down 17 lbs Congratulated pt on this feat Pt has been on a semi-keto diet, where she is avoiding breads, pastas, starches 
  
Reviewed labs 11/18 
  
Pt follows with Dr. Uday Knight (ENT) Last visit was 1/13/17 
  
Pt follows with Dr. Zeeshan Sousa (derm) near [place] Last visit was recently - will get notes for review Pt had a mole removed from her back at that time 
  
Continues xalatan daily  
  
Lov, increased wellbutrin to 300mg (from 150) daily for depression and smoking cessation - reports compliance, happy with dose This is supposed to be helping her with smoking cessation, as well  
  
Pt went to [facility] since lov for a bladder infection This has resolved Pt has a 40 pack year hx Pt is trying to taper down Pt smokes slightly less than 1 PPD Increased dose of wellbutrin should assist with smoking cessation Counseled her on smoking cessation CT lung cancer screen currently not covered by her insurance Recall chantix caused itching in the past.  
  
ACP not on file. SDM is her . Provided information in the past.  
  
PREVENTIVE: 
Colonoscopy: 3/22/17, Dr. Rhonda Shi, repeat 5 years AAA: not needed, denies fmhx Pap: Leeann Lee, 9/7/17 Mammogram: 9/7/17, will get report for review DEXA: 9/7/17, nl, mildly osteopenic in L hip, low FRAX score Tdap: 2012 Pneumovax: declines [id number]: declines Shingrix: declines Flu shot: declines A1c: 8/17 5.7, 5/18 5.8, 11/18 5.5 Eye exam: Dr. Daniel Vigil 4/19/18, Dr. Iris Muhammad, cataract surgs on 8/23/17 Lipids: 5/18  CT lung cancer screen: 12/27/16, nodule in trachea, due - not currently covered by her insurance [id number], nsr Hep C screen: , negative Patient Active Problem List  
 Diagnosis Date Noted  Essential hypertension 2018  Cardiac murmur 2017  Reactive depression 2016 Current Outpatient Medications Medication Sig Dispense Refill  buPROPion XL (WELLBUTRIN XL) 300 mg XL tablet TAKE 1 TABLET EVERY MORNING 90 Tab 1  
 hydroCHLOROthiazide (HYDRODIURIL) 25 mg tablet Take 1 Tab by mouth daily. 30 Tab 3  
 acetaminophen (TYLENOL) 325 mg tablet Take  by mouth every four (4) hours as needed for Pain.  latanoprost (XALATAN) 0.005 % ophthalmic solution Past Surgical History:  
Procedure Laterality Date  HX GYN Complete hysterectomy  [address] Lab Results Component Value Date/Time WBC 8.1 11/15/2018 09:03 AM  
 HGB 14.1 11/15/2018 09:03 AM  
 HCT 43.3 11/15/2018 09:03 AM  
 PLATELET 035  09:03 AM  
 MCV 96 11/15/2018 09:03 AM  
 
Lab Results Component Value Date/Time Cholesterol, total 209 (H) 05/10/2018 08:44 AM  
 HDL Cholesterol 38 (L) 05/10/2018 08:44 AM  
 LDL, calculated 128 (H) 05/10/2018 08:44 AM  
 Triglyceride 213 (H) 05/10/2018 08:44 AM  
 
Lab Results Component Value Date/Time GFR est non-AA 64 11/15/2018 09:03 AM  
 GFR est AA 74 11/15/2018 09:03 AM  
 Creatinine 0.93 11/15/2018 09:03 AM  
 BUN 24 11/15/2018 09:03 AM  
 Sodium 146 (H) 11/15/2018 09:03 AM  
 Potassium 4.9 11/15/2018 09:03 AM  
 Chloride 102 11/15/2018 09:03 AM  
 CO2 29 11/15/2018 09:03 AM  
  
Review of Systems Respiratory: Negative for shortness of breath. Cardiovascular: Negative for chest pain. Physical Exam  
Constitutional: She is oriented to person, place, and time. She appears well-developed and well-nourished. No distress. HENT:  
Head: Normocephalic and atraumatic. Mouth/Throat: Oropharynx is clear and moist. No oropharyngeal exudate. Eyes: Conjunctivae and EOM are normal. Right eye exhibits no discharge. Left eye exhibits no discharge. Neck: Normal range of motion. Neck supple. Cardiovascular: Normal rate, regular rhythm and normal heart sounds. Exam reveals no gallop and no friction rub. No murmur heard. Pulmonary/Chest: Effort normal and breath sounds normal. No respiratory distress. She has no wheezes. She has no rales. She exhibits no tenderness. Musculoskeletal: Normal range of motion. She exhibits no edema, tenderness or deformity. Lymphadenopathy:  
  She has no cervical adenopathy. Neurological: She is alert and oriented to person, place, and time. Coordination normal.  
Skin: Skin is warm and dry. No rash noted. She is not diaphoretic. No erythema. No pallor. Psychiatric: She has a normal mood and affect. Her behavior is normal.  
 
 
ASSESSMENT and PLAN 
  ICD-10-CM ICD-9-CM 1. Essential hypertension Controlled on HCTZ 25mg daily I10 401.9 LIPID PANEL  
   METABOLIC PANEL, COMPREHENSIVE  
   TSH 3RD GENERATION  
   HEMOGLOBIN A1C WITH EAG 2. Reactive depression Controlled on wellbutrin 300mg daily F32.9 300.4 LIPID PANEL  
   METABOLIC PANEL, COMPREHENSIVE  
   TSH 3RD GENERATION  
   HEMOGLOBIN A1C WITH EAG 3. Overweight Doing a great job, down 17 lbs, continue with low carb diet E66.3 278.02 LIPID PANEL  
   METABOLIC PANEL, COMPREHENSIVE  
   TSH 3RD GENERATION  
   HEMOGLOBIN A1C WITH EAG  
4. IFG (impaired fasting glucose) Check a1c prior to f/u, improved with w/l 
 R73.01 790.21 LIPID PANEL  
   METABOLIC PANEL, COMPREHENSIVE  
   TSH 3RD GENERATION  
   HEMOGLOBIN A1C WITH EAG Scribed by Laila Langston of [facility], as dictated by Dr. Theresa Queen. Current diagnosis and concerns discussed with pt at length. Pt understands risks and benefits or current treatment plan and medications, and accepts the treatment and medication with any possible risks.  Pt asks appropriate questions, which were answered. Pt was instructed to call with any concerns or problems. I have reviewed the note documented by the scribe. The services provided are my own. The documentation is accurate

## 2019-01-02 ENCOUNTER — OFFICE VISIT (OUTPATIENT)
Dept: INTERNAL MEDICINE CLINIC | Age: 69
End: 2019-01-02

## 2019-01-02 VITALS
SYSTOLIC BLOOD PRESSURE: 126 MMHG | OXYGEN SATURATION: 97 % | BODY MASS INDEX: 28.42 KG/M2 | RESPIRATION RATE: 16 BRPM | DIASTOLIC BLOOD PRESSURE: 81 MMHG | WEIGHT: 160.4 LBS | HEIGHT: 63 IN | HEART RATE: 71 BPM | TEMPERATURE: 98.4 F

## 2019-01-02 DIAGNOSIS — I10 ESSENTIAL HYPERTENSION: Primary | ICD-10-CM

## 2019-01-02 DIAGNOSIS — F32.9 REACTIVE DEPRESSION: ICD-10-CM

## 2019-01-02 DIAGNOSIS — L29.9 ITCHING: ICD-10-CM

## 2019-01-02 NOTE — PATIENT INSTRUCTIONS
Avoid hot showers    Moisturize--eucerin , aquaphor,  Or cetaphil  Lotions    Zyrtec 10mg nightly over the counter for recurrent itch. If itch returns with no improvement we could try gabapentin.

## 2019-01-02 NOTE — PROGRESS NOTES
Chief Complaint   Patient presents with    Skin Problem     x1 month, itching. 1. Have you been to the ER, urgent care clinic since your last visit? Hospitalized since your last visit? [facility].     2. Have you seen or consulted any other health care providers outside of the [address] [place] [address] since your last visit? Include any pap smears or colon screening.  No

## 2019-01-02 NOTE — PROGRESS NOTES
HISTORY OF PRESENT ILLNESS  Amanda Andrews is a 79 y.o. female. HPI  Last here 11/20/18. Pt is here for acute care.     On 11/29/18 pt developed pain in both hands  They then started itching and swelled  She also had similar sx down her arms, in her feet, and then on the side of her thighs  Pt went to [facility] and was given lidex cream and prednisone taper  Pt thinks that the cream helped her  She took the prednisone but her sx returned 2 days after she stopped this  Pt tried soaking her hands in vinegar which seems to have helped and she does not have much remaining itching  Her head also started itching a few months ago, and she is unsure whether this was related  Denies having any new medications  Pt does not have any remaining rash  Advised avoiding hot showers, and to moisturize BID  Advised her to start gabapentin qhs  Discussed gabapentin if itch were to resume     BP today is 147/81, will repeat this today  SBP was 120s at home, but has not taken it as much recently  Pt has been taking a full tab of HCTZ 25mg  She does not always take this, misses some doses  However she took this meds this AM     Wt today is 160 lbs --stable x lov   Pt has been on a semi-keto diet, where she is avoiding breads, pastas, starches    Reviewed labs 11/18     Pt follows with Dr. Chikis Carmen (ENT)  Last visit was 1/13/17     Pt follows with Dr. Kathryn Garcia (derm) [place]  Last visit was recently - will get notes for review   Pt had a mole removed from her back at that time     Continues [address]. wellbutrin to 300mg daily for depression and smoking cessation  This is supposed to be helping her with smoking cessation, as well   Pt has had increased stress with her sister who is living with her and causing financial difficulties  Overall wellbutrin 300mg is helpful for her depression     Pt has a 40 pack year hx  Pt is trying to taper down  Pt smokes slightly less than 1 PPD  Wellbutrin to assist with cessation  Counseled her on smoking cessation  CT lung cancer screen currently not covered by her insurance  Recall chantix caused itching in the past.      ACP not on file. SDM is her . Provided information in the past.      PREVENTIVE:  Colonoscopy: 3/22/17, Dr. Casey Scott, repeat 5 years  AAA: not needed, denies fmhx  Pap: Leeann Lee, 17  Mammogram: 17, --due   DEXA: 17, nl, mildly osteopenic in L hip, low FRAX score  Tdap:   Pneumovax: declines  [id number]: declines  Shingrix: declines  Flu shot: declines  A1c:  5.7,  5.8,  5.5  Eye exam: Dr. Winifred Huynh, Dr. 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 90 Tab 1    buPROPion XL (WELLBUTRIN XL) 300 mg XL tablet TAKE 1 TABLET EVERY MORNING 90 Tab 1    acetaminophen (TYLENOL) 325 mg tablet Take  by mouth every four (4) hours as needed for Pain.       latanoprost (XALATAN) 0.005 % ophthalmic solution        Past Surgical History:   Procedure Laterality Date    HX GYN      Complete hysterectomy     HX HERNIA REPAIR        Lab Results   Component Value Date/Time    WBC 8.1 11/15/2018 09:03 AM    HGB 14.1 11/15/2018 09:03 AM    HCT 43.3 11/15/2018 09:03 AM    PLATELET 602  09:03 AM    MCV 96 11/15/2018 09:03 AM     Lab Results   Component Value Date/Time    Cholesterol, total 209 (H) 05/10/2018 08:44 AM    HDL Cholesterol 38 (L) 05/10/2018 08:44 AM    LDL, calculated 128 (H) 05/10/2018 08:44 AM    Triglyceride 213 (H) 05/10/2018 08:44 AM     Lab Results   Component Value Date/Time    GFR est non-AA 64 11/15/2018 09:03 AM    GFR est AA 74 11/15/2018 09:03 AM    Creatinine 0.93 11/15/2018 09:03 AM    BUN 24 11/15/2018 09:03 AM    Sodium 146 (H) 11/15/2018 09:03 AM    Potassium 4.9 11/15/2018 09:03 AM    Chloride 102 11/15/2018 09:03 AM    CO2 29 11/15/2018 09:03 AM        Review of Systems   Respiratory: Negative for shortness of breath. Cardiovascular: Negative for chest pain. Physical Exam   Constitutional: She is oriented to person, place, and time. She appears well-developed and well-nourished. No distress. HENT:   Head: Normocephalic and atraumatic. Mouth/Throat: Oropharynx is clear and moist. No oropharyngeal exudate. Eyes: Conjunctivae and EOM are normal. Right eye exhibits no discharge. Left eye exhibits no discharge. Neck: Normal range of motion. Neck supple. Cardiovascular: Normal rate, regular rhythm and normal heart sounds. Exam reveals no gallop and no friction rub. No murmur heard. Pulmonary/Chest: Effort normal and breath sounds normal. No respiratory distress. She has no wheezes. She has no rales. She exhibits no tenderness. Musculoskeletal: Normal range of motion. She exhibits no edema, tenderness or deformity. Lymphadenopathy:     She has no cervical adenopathy. Neurological: She is alert and oriented to person, place, and time. Coordination normal.   Skin: Skin is warm and dry. No rash noted. She is not diaphoretic. No erythema. No pallor. Psychiatric: She has a normal mood and affect. Her behavior is normal.       ASSESSMENT and PLAN    ICD-10-CM ICD-9-CM    1. Essential hypertension    Controlled on HCTZ, continue no change to dose   I10 401.9    2.  Itching    Likely from dry skin, chronic itching and scratching is causing a rash rather than other way around, no visible rash today, discussed avoiding hot showers, moisturizing, will start zyrtec nightly to see if this helps prevent itch, if sx progress would consider gabapentin next   L29.9 698.9    3. Reactive depression    overall well controlled with wellbutrin 300mg daily, she is having some social stressors which are causing some anxiety, for now no change to medication   F32.9 300.4         Scribed by Jared Montoya of [facility], as dictated by Dr. Velma Wright. Current diagnosis and concerns discussed with pt at length. Pt understands risks and benefits or current treatment plan and medications, and accepts the treatment and medication with any possible risks. Pt asks appropriate questions, which were answered. Pt was instructed to call with any concerns or problems. I have reviewed the note documented by the scribe. The services provided are my own.   The documentation is accurate

## 2019-03-23 RX ORDER — BUPROPION HYDROCHLORIDE 300 MG/1
TABLET ORAL
Qty: 90 TAB | Refills: 1 | Status: SHIPPED | OUTPATIENT
Start: 2019-03-23 | End: 2019-11-03 | Stop reason: SDUPTHER

## 2019-05-17 LAB
ALBUMIN SERPL-MCNC: 4.5 G/DL (ref 3.6–4.8)
ALBUMIN/GLOB SERPL: 1.9 {RATIO} (ref 1.2–2.2)
ALP SERPL-CCNC: 70 IU/L (ref 39–117)
ALT SERPL-CCNC: 11 IU/L (ref 0–32)
AST SERPL-CCNC: 14 IU/L (ref 0–40)
BILIRUB SERPL-MCNC: 0.3 MG/DL (ref 0–1.2)
BUN SERPL-MCNC: 21 MG/DL (ref 8–27)
BUN/CREAT SERPL: 26 (ref 12–28)
CALCIUM SERPL-MCNC: 9.5 MG/DL (ref 8.7–10.3)
CHLORIDE SERPL-SCNC: 106 MMOL/L (ref 96–106)
CHOLEST SERPL-MCNC: 205 MG/DL (ref 100–199)
CO2 SERPL-SCNC: 21 MMOL/L (ref 20–29)
CREAT SERPL-MCNC: 0.81 MG/DL (ref 0.57–1)
EST. AVERAGE GLUCOSE BLD GHB EST-MCNC: 111 MG/DL
GLOBULIN SER CALC-MCNC: 2.4 G/DL (ref 1.5–4.5)
GLUCOSE SERPL-MCNC: 90 MG/DL (ref 65–99)
HBA1C MFR BLD: 5.5 % (ref 4.8–5.6)
HDLC SERPL-MCNC: 41 MG/DL
LDLC SERPL CALC-MCNC: 139 MG/DL (ref 0–99)
POTASSIUM SERPL-SCNC: 4.3 MMOL/L (ref 3.5–5.2)
PROT SERPL-MCNC: 6.9 G/DL (ref 6–8.5)
SODIUM SERPL-SCNC: 144 MMOL/L (ref 134–144)
TRIGL SERPL-MCNC: 127 MG/DL (ref 0–149)
TSH SERPL DL<=0.005 MIU/L-ACNC: 1.37 UIU/ML (ref 0.45–4.5)
VLDLC SERPL CALC-MCNC: 25 MG/DL (ref 5–40)

## 2019-05-20 ENCOUNTER — DOCUMENTATION ONLY (OUTPATIENT)
Dept: INTERNAL MEDICINE CLINIC | Age: 69
End: 2019-05-20

## 2019-05-20 NOTE — PROGRESS NOTES
Medicare Part B Preventive Services Guidelines/Limitations Date last completed and Frequency Due Date   Bone Mass Measurement  (age 72 & older, biennial) Requires diagnosis related to osteoporosis or estrogen deficiency. Biennial benefit unless patient has history of long-term glucocorticoid tx or baseline is needed because initial test was by other method Completed 9/7/17      Recommended every 2 years Due 9/2019   Cardiovascular Screening Blood Tests (every 5 years)  Total cholesterol, HDL, Triglycerides Order as a panel if possible Completed 5/2019     Recommended annually Due 5/2020   Colorectal Cancer Screening  -Fecal occult blood test (annual)  -Flexible sigmoidoscopy (5y)  -Screening colonoscopy (10y)  -Barium Enema    Completed 3/2017  with Dr. Eugena Lennox     Recommended repeat in 5 years  Due 3/2022   Counseling to Prevent Tobacco Use (up to 8 sessions per year)  - Counseling greater than 3 and up to 10 minutes  - Counseling greater than 10 minutes Patients must be asymptomatic of tobacco-related conditions to receive as preventive service Current smoker Please call [phone]   Diabetes Screening Tests (at least every 3 years, Medicare covers annually or at 6-month intervals for prediabetic patients)      Fasting blood sugar (FBS) or glucose tolerance test (GTT) Patient must be diagnosed with one of the following:  -Hypertension, Dyslipidemia, obesity, previous impaired FBS or GTT  Or any two of the following: overweight, FH of diabetes, age ? 72, history of gestational diabetes, birth of baby weighing more than 9 pounds Completed 3/2019 with A1C 5.5      Recommended every 3-6 months Due 6/2019 - 9/2019   Diabetes Self-Management Training (DSMT) (no USPSTF recommendation) Requires referral by treating physician for patient with diabetes or renal disease. 10 hours of initial DSMT session of no less than 30 minutes each in a continuous 12-month period. 2 hours of follow-up DSMT in subsequent years.  N/A N/A   Glaucoma Screening (no USPSTF recommendation) Diabetes mellitus, family history, , age 48 or over,  American, age 72 or over Completed 4/2018      Recommended annually Due now   Human Immunodeficiency Virus (HIV) Screening (annually for increased risk patients)  HIV-1 and HIV-2 by EIA, NATALIE, rapid antibody test, or oral mucosa transudate Patient must be at increased risk for HIV infection per USPSTF guidelines or pregnant. Tests covered annually for patients at increased risk. Pregnant patients may receive up to 3 test during pregnancy. N/A N/A   Medical Nutrition Therapy (MNT) (for diabetes or renal disease not recommended schedule) Requires referral by treating physician for patient with diabetes or renal disease. Can be provided in same year as diabetes self-management training (DSMT), and CMS recommends medical nutrition therapy take place after DSMT. Up to 3 hours for initial year and 2 hours in subsequent years. N/A N/A         Seasonal Influenza Vaccination (annually)    Never completed     Recommended annually Declines    Pneumococcal Vaccination (once after 72)    Pneumococcal 21 - never completed      Prevnar 15 - never completed       Both recommended once over the age of 72 Declines        Declines    Hepatitis B Vaccinations (if medium/high risk) Medium/high risk factors: End-stage renal disease,  Hemophiliacs who received Factor VIII or IX concentrates, Clients of institutions for the mentally retarded, Persons who live in the same house as a HepB virus carrier, Homosexual men, Illicit injectable drug abusers. N/A N/A   Screening Mammography (biennial age 54-69)?  Annually (age 36 or over) Completed 9/2017     Recommended annually  Due now   Screening Pap Tests and Pelvic Examination (up to age 79 and after 79 if unknown history or abnormal study last 10 years) Every 24 months except high risk Completed 9/2017     Recommended every 2 years Due 9/2019   Ultrasound Screening for Abdominal Aortic Aneurysm (AAA) (once) Patient must be referred through [facility] and not have had a screening for abdominal aortic aneurysm before under Medicare.  Limited to patients who meet one of the following criteria:  - Men who are 73-68 years old and have smoked more than 100 cigarettes in their lifetime.  -Anyone with a FH of AAA  -Anyone recommended for screening by USPSTF n/a n/a

## 2019-05-21 ENCOUNTER — OFFICE VISIT (OUTPATIENT)
Dept: INTERNAL MEDICINE CLINIC | Age: 69
End: 2019-05-21

## 2019-05-21 VITALS
WEIGHT: 163 LBS | BODY MASS INDEX: 28.88 KG/M2 | OXYGEN SATURATION: 97 % | DIASTOLIC BLOOD PRESSURE: 65 MMHG | RESPIRATION RATE: 16 BRPM | HEIGHT: 63 IN | HEART RATE: 60 BPM | TEMPERATURE: 98.1 F | SYSTOLIC BLOOD PRESSURE: 109 MMHG

## 2019-05-21 DIAGNOSIS — F32.9 REACTIVE DEPRESSION: ICD-10-CM

## 2019-05-21 DIAGNOSIS — R73.01 IFG (IMPAIRED FASTING GLUCOSE): ICD-10-CM

## 2019-05-21 DIAGNOSIS — E78.2 MIXED HYPERLIPIDEMIA: ICD-10-CM

## 2019-05-21 DIAGNOSIS — E66.3 OVERWEIGHT: ICD-10-CM

## 2019-05-21 DIAGNOSIS — I10 ESSENTIAL HYPERTENSION: Primary | ICD-10-CM

## 2019-05-21 DIAGNOSIS — Z00.00 MEDICARE ANNUAL WELLNESS VISIT, SUBSEQUENT: ICD-10-CM

## 2019-05-21 DIAGNOSIS — Z87.891 PERSONAL HISTORY OF NICOTINE DEPENDENCE: ICD-10-CM

## 2019-05-21 NOTE — PATIENT INSTRUCTIONS
Medicare Wellness Visit, Female The best way to live healthy is to have a lifestyle where you eat a well-balanced diet, exercise regularly, limit alcohol use, and quit all forms of tobacco/nicotine, if applicable. Regular preventive services are another way to keep healthy. Preventive services (vaccines, screening tests, monitoring & exams) can help personalize your care plan, which helps you manage your own care. Screening tests can find health problems at the earliest stages, when they are easiest to treat. [facility] follows the current, evidence-based guidelines published by the [facility] Jul[facility] ([facility]) when recommending preventive services for our patients. Because we follow these guidelines, sometimes recommendations change over time as research supports it. (For example, mammograms used to be recommended annually. Even though Medicare will still pay for an annual mammogram, the newer guidelines recommend a mammogram every two years for women of average risk.) Of course, you and your doctor may decide to screen more often for some diseases, based on your risk and your health status. Preventive services for you include: - Medicare offers their members a free annual wellness visit, which is time for you and your primary care provider to discuss and plan for your preventive service needs. Take advantage of this benefit every year! 
-All adults over the age of 72 should receive the recommended pneumonia vaccines. Current USPSTF guidelines recommend a series of two vaccines for the best pneumonia protection.  
-All adults should have a flu vaccine yearly and a tetanus vaccine every 10 years. All adults age 61 and older should receive a shingles vaccine once in their lifetime.   
-A bone mass density test is recommended when a woman turns 65 to screen for osteoporosis. This test is only recommended one time, as a screening. Some providers will use this same test as a disease monitoring tool if you already have osteoporosis. -All adults age 38-68 who are overweight should have a diabetes screening test once every three years.  
-Other screening tests and preventive services for persons with diabetes include: an eye exam to screen for diabetic retinopathy, a kidney function test, a foot exam, and stricter control over your cholesterol.  
-Cardiovascular screening for adults with routine risk involves an electrocardiogram (ECG) at intervals determined by your doctor.  
-Colorectal cancer screenings should be done for adults age 54-65 with no increased risk factors for colorectal cancer. There are a number of acceptable methods of screening for this type of cancer. Each test has its own benefits and drawbacks. Discuss with your doctor what is most appropriate for you during your annual wellness visit. The different tests include: colonoscopy (considered the best screening method), a fecal occult blood test, a fecal DNA test, and sigmoidoscopy. -Breast cancer screenings are recommended every other year for women of normal risk, age 54-69. 
-Cervical cancer screenings for women over age 72 are only recommended with certain risk factors.  
-All adults born between [facility] should be screened once for Hepatitis C. Here is a list of your current Health Maintenance items (your personalized list of preventive services) with a due date: 
Health Maintenance Due Topic Date Due  Pneumococcal Vaccine (1 of 2 - PCV13) 05/06/2017 [address] [place] [address] Annual Well Visit  05/17/2019  Colonoscopy  07/14/2019  Mammogram  09/07/2019 Medicare Part B Preventive Services Guidelines/Limitations Date last completed and Frequency Due Date Bone Mass Measurement 
(age 72 & older, biennial) Requires diagnosis related to osteoporosis or estrogen deficiency.  Biennial benefit unless patient has history of long-term glucocorticoid tx or baseline is needed because initial test was by other method Completed 9/7/17 
  
Recommended every 2 years Due 9/2019 Cardiovascular Screening Blood Tests (every 5 years) Total cholesterol, HDL, Triglycerides Order as a panel if possible Completed 5/2019 
  
Recommended annually Due 5/2020 Colorectal Cancer Screening 
-Fecal occult blood test (annual) -Flexible sigmoidoscopy (5y) 
-Screening colonoscopy (10y) -Barium Enema   Completed 3/2017  with Dr. Wero Bhatt 
  
Recommended repeat in 5 years  Due 3/2022 Counseling to Prevent Tobacco Use (up to 8 sessions per year) - Counseling greater than 3 and up to 10 minutes - Counseling greater than 10 minutes Patients must be asymptomatic of tobacco-related conditions to receive as preventive service Current smoker Please call [phone] Diabetes Screening Tests (at least every 3 years, Medicare covers annually or at 6-month intervals for prediabetic patients) 
  Fasting blood sugar (FBS) or glucose tolerance test (GTT) Patient must be diagnosed with one of the following: 
-Hypertension, Dyslipidemia, obesity, previous impaired FBS or GTT 
Or any two of the following: overweight, FH of diabetes, age ? 72, history of gestational diabetes, birth of baby weighing more than 9 pounds Completed 3/2019 with A1C 5.5 
  
Recommended every 3-6 months Due 6/2019  9/2019 Diabetes Self-Management Training (DSMT) (no USPSTF recommendation) Requires referral by treating physician for patient with diabetes or renal disease. 10 hours of initial DSMT session of no less than 30 minutes each in a continuous 12-month period. 2 hours of follow-up DSMT in subsequent years. N/A N/A Glaucoma Screening (no USPSTF recommendation) Diabetes mellitus, family history, , age 48 or over,  American, age 72 or over Completed 4/2019 
  
Recommended annually Due 4/2020 Human Immunodeficiency Virus (HIV) Screening (annually for increased risk patients) HIV-1 and HIV-2 by EIA, NATALIE, rapid antibody test, or oral mucosa transudate Patient must be at increased risk for HIV infection per USPSTF guidelines or pregnant. Tests covered annually for patients at increased risk. Pregnant patients may receive up to 3 test during pregnancy. N/A N/A Medical Nutrition Therapy (MNT) (for diabetes or renal disease not recommended schedule) Requires referral by treating physician for patient with diabetes or renal disease. Can be provided in same year as diabetes self-management training (DSMT), and CMS recommends medical nutrition therapy take place after DSMT. Up to 3 hours for initial year and 2 hours in subsequent years. N/A N/A  
         
Seasonal Influenza Vaccination (annually)   Never completed 
  
Recommended annually Declines Pneumococcal Vaccination (once after 65)   Pneumococcal 23 - never completed  
  
Prevnar 13 - never completed   
  
Both recommended once over the age of 72 Declines 
  
  
Declines Hepatitis B Vaccinations (if medium/high risk) Medium/high risk factors: End-stage renal disease, Hemophiliacs who received Factor VIII or IX concentrates, Clients of institutions for the mentally retarded, Persons who live in the same house as a HepB virus carrier, Homosexual men, Illicit injectable drug abusers. N/A N/A Screening Mammography (biennial age 54-69)? Annually (age 36 or over) Completed 9/2017 
  
Recommended annually  Due now Screening Pap Tests and Pelvic Examination (up to age 79 and after 79 if unknown history or abnormal study last 10 years) Every 24 months except high risk Completed 9/2017 
  
Recommended every 2 years Due 9/2019 Ultrasound Screening for Abdominal Aortic Aneurysm (AAA) (once) Patient must be referred through IPPE and not have had a screening for abdominal aortic aneurysm before under Medicare.  Limited to patients who meet one of the following criteria: 
- Men who are 73-68 years old and have smoked more than 100 cigarettes in their lifetime. 
-Anyone with a FH of AAA 
-Anyone recommended for screening by USPSTF n/a n/a  
  
 
 
SCHEDULE PELVIC, MAMMO AND BONE DENSITY AT [address] IN September TOGETHER 
 
STAY OFF HCTZ Please bring in a copy of your advanced directive to your next office visit so we can have a copy on file.

## 2019-05-21 NOTE — PROGRESS NOTES
HISTORY OF PRESENT ILLNESS  Maik Orta is a 81 y.o. female. HPI  Last here 1/2/19. Pt is here for routine care.     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 Tex Martines (derm) near [address]  Last visit was in the winter 2018     Continues xalatan daily      Continues wellbutrin to 300mg daily for depression and smoking cessation  This is supposed to be helping her with smoking cessation, as well   Overall wellbutrin is helpful for her depression     Pt has a 40 pack year hx  Pt is trying to taper down  Pt smokes slightly less than 1 PPD  Wellbutrin to assist with cessation  Counseled her on smoking cessation 5/19  CT lung cancer screen currently not covered by her insurance - will reorder and see if this has changed  Recall chantix caused itching in the past.     Lives with her , this is a safe environment    Fully functional independently    Denies hearing problems    No memory concerns     ACP not on file. SDM is her .   Provided information again today.      PREVENTIVE:  Colonoscopy: 3/22/17, Dr. Archer Neither, repeat 5 years  AAA: not needed, denies fmhx  Pap: Leeann Page, 9/7/17  Mammogram: 9/7/17, --due, reminded  DEXA: 9/7/17, nl, mildly osteopenic in L hip, low FRAX score  Tdap: 2012  Pneumovax: declines  [id number]: declines  Shingrix: declines  Flu shot: declines  A1c:  5.7,  5.8,  5.5,  5.5  Eye exam: Dr. Sapna Mohr, , Dr. 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 90 Tab 1    acetaminophen (TYLENOL) 325 mg tablet Take  by mouth every four (4) hours as needed for Pain.  latanoprost (XALATAN) 0.005 % ophthalmic solution       DM/PE/acetaminophen/doxylamine (VICKS DAYQUIL-NYQUIL COLD-FLU PO) NyQuil Liquicaps capsule   Take 1 capsule every day by oral route at bedtime.        Past Surgical History:   Procedure Laterality Date    HX GYN      Complete hysterectomy     HX HERNIA REPAIR        Lab Results   Component Value Date/Time    WBC 8.1 11/15/2018 09:03 AM    HGB 14.1 11/15/2018 09:03 AM    HCT 43.3 11/15/2018 09:03 AM    PLATELET 568  09:03 AM    MCV 96 11/15/2018 09:03 AM     Lab Results   Component Value Date/Time    Cholesterol, total 205 (H) 2019 10:36 AM    HDL Cholesterol 41 2019 10:36 AM    LDL, calculated 139 (H) 2019 10:36 AM    Triglyceride 127 2019 10:36 AM     Lab Results   Component Value Date/Time    GFR est non-AA 75 2019 10:36 AM    GFR est AA 87 2019 10:36 AM    Creatinine 0.81 2019 10:36 AM    BUN 21 2019 10:36 AM    Sodium 144 2019 10:36 AM    Potassium 4.3 2019 10:36 AM    Chloride 106 2019 10:36 AM    CO2 21 2019 10:36 AM        Review of Systems   Respiratory: Negative for shortness of breath. Cardiovascular: Negative for chest pain. Physical Exam   Constitutional: She is oriented to person, place, and time. She appears well-developed and well-nourished. No distress. HENT:   Head: Normocephalic and atraumatic. Right Ear: External ear normal.   Left Ear: External ear normal.   Mouth/Throat: Oropharynx is clear and moist. No oropharyngeal exudate. Eyes: Conjunctivae and EOM are normal. Right eye exhibits no discharge. Left eye exhibits no discharge. Neck: Normal range of motion. Neck supple. No carotid bruits    Cardiovascular: Normal rate, regular rhythm and normal heart sounds. Exam reveals no gallop and no friction rub. No murmur heard. Pulmonary/Chest: Effort normal and breath sounds normal. No respiratory distress. She has no wheezes. She has no rales. She exhibits no tenderness. Abdominal: Soft. She exhibits no distension and no mass. There is no tenderness. There is no rebound and no guarding. Musculoskeletal: Normal range of motion. She exhibits no edema, tenderness or deformity. Lymphadenopathy:     She has no cervical adenopathy. Neurological: She is alert and oriented to person, place, and time. Coordination normal.   Skin: Skin is warm and dry. No rash noted. She is not diaphoretic. No erythema. No pallor. Psychiatric: She has a normal mood and affect. Her behavior is normal.       ASSESSMENT and PLAN    ICD-10-CM ICD-9-CM    1.  Essential hypertension    BP has dropped over the last few months, was having lightheadedness in the AM, BP very well controlled, has not been taking HCTZ, will stop HCTZ entirely   J78 703.9 METABOLIC PANEL, BASIC      CBC W/O DIFF      HEMOGLOBIN A1C WITH EAG      LIPID PANEL   2. Medicare annual wellness visit, subsequent W74.47 D65.8 METABOLIC PANEL, BASIC      CBC W/O DIFF      HEMOGLOBIN A1C WITH EAG      LIPID PANEL   3. Reactive depression    Well controlled on wellbutrin 300mg, continue   F32.9 561.0 METABOLIC PANEL, BASIC      CBC W/O DIFF      HEMOGLOBIN A1C WITH EAG      LIPID PANEL   4. Personal history of nicotine dependence    Trying to taper off cigarettes, ordered CT lung cancer screen, was not covered last year   Z87.891 V15.82 CT LOW DOSE LUNG CANCER SCREENING      METABOLIC PANEL, BASIC      CBC W/O DIFF      HEMOGLOBIN A1C WITH EAG      LIPID PANEL   5. Overweight    Counseled on diet and w/l, stable from lov, ultimately improved from several years ago, continues to work on portion control   R92.9 846.23 METABOLIC PANEL, BASIC      CBC W/O DIFF      HEMOGLOBIN A1C WITH EAG      LIPID PANEL   6. Mixed hyperlipidemia    Diet controlled, no meds for now   D66.0 649.9 METABOLIC PANEL, BASIC      CBC W/O DIFF      HEMOGLOBIN A1C WITH EAG      LIPID PANEL   7. IFG (impaired fasting glucose)    a1cs have improved with w/l, will continue to monitor   M98.33 362.71 METABOLIC PANEL, BASIC      CBC W/O DIFF      HEMOGLOBIN A1C WITH EAG      LIPID PANEL    dep screen neg    Scribed by Roland Odell of [address] [place] [address] [address], as dictated by Dr. Shobha Morgan. Current diagnosis and concerns discussed with pt at length. Pt understands risks and benefits or current treatment plan and medications, and accepts the treatment and medication with any possible risks. Pt asks appropriate questions, which were answered. Pt was instructed to call with any concerns or problems. I have reviewed the note documented by the scribe. The services provided are my own.   The documentation is accurate

## 2019-05-21 NOTE — PROGRESS NOTES
This is the Subsequent Medicare Annual Wellness Exam, performed 12 months or more after the Initial AWV or the last Subsequent AWV    I have reviewed the patient's medical history in detail and updated the computerized patient record. History   No past medical history on file. Past Surgical History:   Procedure Laterality Date    HX GYN      Complete hysterectomy 1993    HX HERNIA REPAIR  1982     Current Outpatient Medications   Medication Sig Dispense Refill    buPROPion XL (WELLBUTRIN XL) 300 mg XL tablet TAKE 1 TABLET EVERY MORNING 90 Tab 1    DM/PE/acetaminophen/doxylamine (VICKS DAYQUIL-NYQUIL COLD-FLU PO) NyQuil Liquicaps capsule   Take 1 capsule every day by oral route at bedtime.  hydroCHLOROthiazide (HYDRODIURIL) 25 mg tablet Take 1 Tab by mouth daily. 90 Tab 1    acetaminophen (TYLENOL) 325 mg tablet Take  by mouth every four (4) hours as needed for Pain.  latanoprost (XALATAN) 0.005 % ophthalmic solution        Allergies   Allergen Reactions    Erythromycin Other (comments)     Stomach cramps    Morphine Itching     Family History   Problem Relation Age of Onset    Hypertension Mother     Diabetes Father     Diabetes Sister     Diabetes Brother     Hypertension Brother      Social History     Tobacco Use    Smoking status: Current Every Day Smoker     Packs/day: 1.50    Smokeless tobacco: Never Used   Substance Use Topics    Alcohol use: No     Patient Active Problem List   Diagnosis Code    Reactive depression F32.9    Cardiac murmur R01.1    Essential hypertension I10       Depression Risk Factor Screening:     3 most recent PHQ Screens 5/21/2019   Little interest or pleasure in doing things Not at all   Feeling down, depressed, irritable, or hopeless Not at all   Total Score PHQ 2 0     Alcohol Risk Factor Screening: You do not drink alcohol or very rarely. Functional Ability and Level of Safety:   Hearing Loss  Hearing is good.     Activities of Daily Living  The home contains: no safety equipment. Patient does total self care    Fall Risk  Fall Risk Assessment, last 12 mths 5/21/2019   Able to walk? Yes   Fall in past 12 months? No       Abuse Screen  Patient is not abused  Safe, lives with   Cognitive Screening   Evaluation of Cognitive Function:  Has your family/caregiver stated any concerns about your memory: no  Normal    Patient Care Team   Patient Care Team:  Natalie Riggs MD as PCP - General (Internal Medicine)  Meredith Velasquez MD (Otolaryngology)  Belia Aviles MD (Colon and Rectal Surgery)  justina (Ophthalmology)  Kimberlyn Bruce MD (Ophthalmology)  Fortunato Barrios NP (Obstetrics & Gynecology)   Dr angeli marie    updated    Assessment/Plan   Education and counseling provided:  Are appropriate based on today's review and evaluation  End-of-Life planning (with patient's consent)  Pneumococcal Vaccine  Influenza Vaccine  Screening Mammography  Screening Pap and pelvic (covered once every 2 years)  Bone mass measurement (DEXA)  Screening for glaucoma    Diagnoses and all orders for this visit:    1. Medicare annual wellness visit, subsequent        Health Maintenance Due   Topic Date Due    Pneumococcal 65+ years (1 of 2 - PCV13) 05/06/2017    MEDICARE YEARLY EXAM  05/17/2019    COLONOSCOPY  07/14/2019    BREAST CANCER SCRN MAMMOGRAM  09/07/2019     Discussed with patient about advance medical directive. Provided patient blank AMD and Your Right to Decide Booklet. Requested that if completed to provide a copy of AMD to office. ACP not on file. SDM is her .   Provided information again today.        Colonoscopy: 3/22/17, Dr. Abbie Garvey, repeat 5 years  AAA: not needed, denies fmhx  Pap: Leeann Lee, 9/7/17  Mammogram: 9/7/17, --due, reminded  DEXA: 9/7/17, nl, mildly osteopenic in L hip, low FRAX score    Tdap: 2012  Pneumovax: declines  [id number]: declines  Shingrix: declines  Flu shot: declines    A1c: , 5/19 5.5 [id number]    Eye exam: Dr. Stephan Byrne, , annual    Lipids:   annual    CT lung cancer screen: 16, nodule in trachea, due -   EK16, nsr   Hep C screen: , negative     Medication reconciliation completed by MA and reviewed by me. Medical/surgical/social/family history reviewed and updated by me. Patient provided AVS and preventative screening table. Patient verbalized understanding of all information discussed.

## 2019-11-04 RX ORDER — BUPROPION HYDROCHLORIDE 300 MG/1
300 TABLET ORAL
Qty: 90 TAB | Refills: 1 | Status: SHIPPED | OUTPATIENT
Start: 2019-11-04 | End: 2019-11-19 | Stop reason: SDUPTHER

## 2019-11-14 DIAGNOSIS — I10 ESSENTIAL HYPERTENSION: ICD-10-CM

## 2019-11-14 DIAGNOSIS — R73.01 IFG (IMPAIRED FASTING GLUCOSE): ICD-10-CM

## 2019-11-14 DIAGNOSIS — Z87.891 PERSONAL HISTORY OF NICOTINE DEPENDENCE: ICD-10-CM

## 2019-11-14 DIAGNOSIS — F32.9 REACTIVE DEPRESSION: ICD-10-CM

## 2019-11-14 DIAGNOSIS — Z00.00 MEDICARE ANNUAL WELLNESS VISIT, SUBSEQUENT: ICD-10-CM

## 2019-11-14 DIAGNOSIS — E66.3 OVERWEIGHT: ICD-10-CM

## 2019-11-14 DIAGNOSIS — E78.2 MIXED HYPERLIPIDEMIA: ICD-10-CM

## 2019-11-15 LAB
BUN SERPL-MCNC: 26 MG/DL (ref 8–27)
BUN/CREAT SERPL: 29 (ref 12–28)
CALCIUM SERPL-MCNC: 9.7 MG/DL (ref 8.7–10.3)
CHLORIDE SERPL-SCNC: 104 MMOL/L (ref 96–106)
CHOLEST SERPL-MCNC: 207 MG/DL (ref 100–199)
CO2 SERPL-SCNC: 26 MMOL/L (ref 20–29)
CREAT SERPL-MCNC: 0.89 MG/DL (ref 0.57–1)
ERYTHROCYTE [DISTWIDTH] IN BLOOD BY AUTOMATED COUNT: 11.6 % (ref 12.3–15.4)
EST. AVERAGE GLUCOSE BLD GHB EST-MCNC: 117 MG/DL
GLUCOSE SERPL-MCNC: 105 MG/DL (ref 65–99)
HBA1C MFR BLD: 5.7 % (ref 4.8–5.6)
HCT VFR BLD AUTO: 44 % (ref 34–46.6)
HDLC SERPL-MCNC: 43 MG/DL
HGB BLD-MCNC: 14.6 G/DL (ref 11.1–15.9)
LDLC SERPL CALC-MCNC: 138 MG/DL (ref 0–99)
MCH RBC QN AUTO: 32 PG (ref 26.6–33)
MCHC RBC AUTO-ENTMCNC: 33.2 G/DL (ref 31.5–35.7)
MCV RBC AUTO: 97 FL (ref 79–97)
PLATELET # BLD AUTO: 315 X10E3/UL (ref 150–450)
POTASSIUM SERPL-SCNC: 4.4 MMOL/L (ref 3.5–5.2)
RBC # BLD AUTO: 4.56 X10E6/UL (ref 3.77–5.28)
SODIUM SERPL-SCNC: 144 MMOL/L (ref 134–144)
TRIGL SERPL-MCNC: 130 MG/DL (ref 0–149)
VLDLC SERPL CALC-MCNC: 26 MG/DL (ref 5–40)
WBC # BLD AUTO: 10 X10E3/UL (ref 3.4–10.8)

## 2019-11-18 NOTE — PROGRESS NOTES
HISTORY OF PRESENT ILLNESS  Cheli Vines is a 81 y.o. female. HPI   Last here 5/21/19  Pt is here for routine care.     BP today is 121/65  BP at home 118/72   Lov, pt had stopped hctz after some dizziness due to low bp  however she has gone back to taking a half tab      Wt today is 169 lbs-- up 6 lbs x lov   Pt is trying to do a lower carb diet   Discussed not gaining wt during holidays   discussed diet and wt loss   Discussed focusing diet for a1c and cholesterol control     Reviewed labs 11/19      Pt follows with Dr. Lavon Pittman (ENT) for trachea nodule  Last visit was 1/13/17     Pt follows with Dr. Sanders Mohs (derm) near [address]  Last visit was in the winter 2018  Only f/u prn      Continues xalatan daily      Continues wellbutrin to 300mg daily for depression and smoking cessation  This is supposed to be helping her with smoking cessation, as well   Overall wellbutrin is helpful for her depression 11/19     Pt has a 40 pack year hx  Pt is trying to taper down  Pt smokes slightly less than 1 PPD  Wellbutrin to assist with cessation, however pt has been smoking the same amount   Counseled her on smoking cessation 11/19  CT lung cancer screen currently not covered by her insurance, reordered this and still not covered   Recall chantix caused itching in the past.        Reviewed colo report that pt brought in: polyps found, 5 year repeat   Will get report for review     Advised pt to take otc vit D     Murmur heard on exam   Echo was completed in the 90s   Ordered repeat echo      ACP not on file. SDM is her .   Provided information again today.      PREVENTIVE:  Colonoscopy: 3/22/17, Dr. Horace Burgos, repeat 5 years  AAA: not needed, denies fmhx  Pap: Leeann Lee, 10/17/19  Mammogram: 10/19, will get report   DEXA: 10/17/19, osteopenia in hip, nl neck and spine   Tdap: 2012  Pneumovax: declines  [id number]: declines  Shingrix: declines  Flu shot: declines  A1c: 8/17 5.7, 5/18 5.8, 11/18 5.5, 5/19 5.5  5.7   Eye exam: Dr. Cecy Cox, , Dr. 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 90 Tab 1    DM/PE/acetaminophen/doxylamine (VICKS DAYQUIL-NYQUIL COLD-FLU PO) NyQuil Liquicaps capsule   Take 1 capsule every day by oral route at bedtime.  hydroCHLOROthiazide (HYDRODIURIL) 25 mg tablet Take 1 Tab by mouth daily. 90 Tab 1    acetaminophen (TYLENOL) 325 mg tablet Take  by mouth every four (4) hours as needed for Pain.       latanoprost (XALATAN) 0.005 % ophthalmic solution        Past Surgical History:   Procedure Laterality Date    HX GYN      Complete hysterectomy     HX HERNIA REPAIR        Lab Results   Component Value Date/Time    WBC 10.0 2019 08:25 AM    HGB 14.6 2019 08:25 AM    HCT 44.0 2019 08:25 AM    PLATELET 156  08:25 AM    MCV 97 2019 08:25 AM     Lab Results   Component Value Date/Time    Cholesterol, total 207 (H) 2019 08:25 AM    HDL Cholesterol 43 2019 08:25 AM    LDL, calculated 138 (H) 2019 08:25 AM    Triglyceride 130 2019 08:25 AM     Lab Results   Component Value Date/Time    GFR est non-AA 67 2019 08:25 AM    GFR est AA 78 2019 08:25 AM    Creatinine 0.89 2019 08:25 AM    BUN 26 2019 08:25 AM    Sodium 144 2019 08:25 AM    Potassium 4.4 2019 08:25 AM    Chloride 104 2019 08:25 AM    CO2 26 2019 08:25 AM        Review of Systems   Respiratory: Negative for shortness of breath. Cardiovascular: Negative for chest pain. Physical Exam   Constitutional: She is oriented to person, place, and time. She appears well-developed and well-nourished. No distress. HENT:   Head: Normocephalic and atraumatic. Mouth/Throat: Oropharynx is clear and moist. No oropharyngeal exudate. Eyes: Conjunctivae and EOM are normal. Right eye exhibits no discharge. Left eye exhibits no discharge. Neck: Normal range of motion. Neck supple. Cardiovascular: Normal rate and regular rhythm. Exam reveals no gallop and no friction rub. Murmur (1-2/6) heard. Pulmonary/Chest: Effort normal and breath sounds normal. No respiratory distress. She has no wheezes. She has no rales. She exhibits no tenderness. Musculoskeletal: Normal range of motion. She exhibits no edema, tenderness or deformity. Lymphadenopathy:     She has no cervical adenopathy. Neurological: She is alert and oriented to person, place, and time. Coordination normal.   Skin: Skin is warm and dry. No rash noted. She is not diaphoretic. No erythema. No pallor. Psychiatric: She has a normal mood and affect. Her behavior is normal.       ASSESSMENT and PLAN    ICD-10-CM ICD-9-CM    1. Essential hypertension                  Controlled on hctz 12.5 mg daily continue  O88 131.0 METABOLIC PANEL, COMPREHENSIVE      TSH 3RD GENERATION      HEMOGLOBIN A1C WITH EAG      LIPID PANEL   2. Reactive depression                Controlled on wellbutrin continue no change  S14.4 314.8 METABOLIC PANEL, COMPREHENSIVE      TSH 3RD GENERATION      HEMOGLOBIN A1C WITH EAG      LIPID PANEL   3. Smoking          wellbutrin to assist with cessation she is trying to taper but has not made much progress ct lung cancer screen not covered ordered lov  I34.580 427.6 METABOLIC PANEL, COMPREHENSIVE      TSH 3RD GENERATION      HEMOGLOBIN A1C WITH EAG      LIPID PANEL   4.  Overweight                  Wt up from lov working on lower carb diet to make improvements  I61.9 486.77 METABOLIC PANEL, COMPREHENSIVE      TSH 3RD GENERATION      HEMOGLOBIN A1C WITH EAG      LIPID PANEL   5. IFG (impaired fasting glucose)            Check a1c  K66.34 102.40 METABOLIC PANEL, COMPREHENSIVE      TSH 3RD GENERATION      HEMOGLOBIN A1C WITH EAG      LIPID PANEL   6. Cardiac murmur              Check echo  R01.1 785.2 ECHO ADULT COMPLETE              Scribed by Meredith Oppenheim of [address] [place] [address] [address], as dictated by Dr. Katie Ford. Current diagnosis and concerns discussed with pt at length. Pt understands risks and benefits or current treatment plan and medications, and accepts the treatment and medication with any possible risks. Pt asks appropriate questions, which were answered. Pt was instructed to call with any concerns or problems. I have reviewed the note documented by the scribe. The services provided are my own.   The documentation is accurate

## 2019-11-19 ENCOUNTER — OFFICE VISIT (OUTPATIENT)
Dept: INTERNAL MEDICINE CLINIC | Age: 69
End: 2019-11-19

## 2019-11-19 VITALS
BODY MASS INDEX: 29.95 KG/M2 | RESPIRATION RATE: 16 BRPM | SYSTOLIC BLOOD PRESSURE: 120 MMHG | HEART RATE: 50 BPM | HEIGHT: 63 IN | DIASTOLIC BLOOD PRESSURE: 65 MMHG | WEIGHT: 169 LBS | OXYGEN SATURATION: 98 % | TEMPERATURE: 97.7 F

## 2019-11-19 DIAGNOSIS — R01.1 CARDIAC MURMUR: ICD-10-CM

## 2019-11-19 DIAGNOSIS — R73.01 IFG (IMPAIRED FASTING GLUCOSE): ICD-10-CM

## 2019-11-19 DIAGNOSIS — E66.3 OVERWEIGHT: ICD-10-CM

## 2019-11-19 DIAGNOSIS — I10 ESSENTIAL HYPERTENSION: Primary | ICD-10-CM

## 2019-11-19 DIAGNOSIS — F17.200 SMOKING: ICD-10-CM

## 2019-11-19 DIAGNOSIS — F32.9 REACTIVE DEPRESSION: ICD-10-CM

## 2019-11-19 RX ORDER — HYDROCHLOROTHIAZIDE 25 MG/1
12.5 TABLET ORAL DAILY
Qty: 45 TAB | Refills: 1 | Status: SHIPPED | OUTPATIENT
Start: 2019-11-19 | End: 2020-08-19 | Stop reason: SDUPTHER

## 2019-11-19 RX ORDER — BUPROPION HYDROCHLORIDE 300 MG/1
300 TABLET ORAL
Qty: 90 TAB | Refills: 1 | Status: SHIPPED | OUTPATIENT
Start: 2019-11-19 | End: 2020-08-19

## 2020-08-18 NOTE — TELEPHONE ENCOUNTER
Called, spoke to pt  Received two pt identifiers  Pt offered and accepted appt for 08/19/20 @ 0815  Pt verbalizes understanding of the instructions and has no further questions at this time.     Future Appointments:  Future Appointments   Date Time Provider Brenda Elli   8/19/2020  8:15 AM Srinivasan Phan MD [facility] BS AMB        Last Appointment With Me:  Visit date not found     Last Appointment My Department:  Visit date not found    Requested Prescriptions     Pending Prescriptions Disp Refills    buPROPion XL (WELLBUTRIN XL) 300 mg XL tablet [Pharmacy Med Name: BUPROPION HYDROCHLORIDE ER (XL) 300 MG Tablet Extended Release 24 Hour] 90 Tab 1     Sig: TAKE 1 TABLET EVERY MORNING

## 2020-08-19 ENCOUNTER — VIRTUAL VISIT (OUTPATIENT)
Dept: INTERNAL MEDICINE CLINIC | Age: 70
End: 2020-08-19
Payer: MEDICARE

## 2020-08-19 DIAGNOSIS — R73.01 IFG (IMPAIRED FASTING GLUCOSE): ICD-10-CM

## 2020-08-19 DIAGNOSIS — Z00.00 MEDICARE ANNUAL WELLNESS VISIT, SUBSEQUENT: ICD-10-CM

## 2020-08-19 DIAGNOSIS — F32.9 REACTIVE DEPRESSION: ICD-10-CM

## 2020-08-19 DIAGNOSIS — I10 ESSENTIAL HYPERTENSION: Primary | ICD-10-CM

## 2020-08-19 DIAGNOSIS — R01.1 CARDIAC MURMUR: ICD-10-CM

## 2020-08-19 DIAGNOSIS — F17.200 SMOKER: ICD-10-CM

## 2020-08-19 PROCEDURE — G9717 DOC PT DX DEP/BP F/U NT REQ: HCPCS | Performed by: INTERNAL MEDICINE

## 2020-08-19 PROCEDURE — G0439 PPPS, SUBSEQ VISIT: HCPCS | Performed by: INTERNAL MEDICINE

## 2020-08-19 PROCEDURE — 99213 OFFICE O/P EST LOW 20 MIN: CPT | Performed by: INTERNAL MEDICINE

## 2020-08-19 PROCEDURE — 3017F COLORECTAL CA SCREEN DOC REV: CPT | Performed by: INTERNAL MEDICINE

## 2020-08-19 PROCEDURE — G9899 SCRN MAM PERF RSLTS DOC: HCPCS | Performed by: INTERNAL MEDICINE

## 2020-08-19 PROCEDURE — G8756 NO BP MEASURE DOC: HCPCS | Performed by: INTERNAL MEDICINE

## 2020-08-19 PROCEDURE — 1101F PT FALLS ASSESS-DOCD LE1/YR: CPT | Performed by: INTERNAL MEDICINE

## 2020-08-19 PROCEDURE — 1090F PRES/ABSN URINE INCON ASSESS: CPT | Performed by: INTERNAL MEDICINE

## 2020-08-19 PROCEDURE — G8536 NO DOC ELDER MAL SCRN: HCPCS | Performed by: INTERNAL MEDICINE

## 2020-08-19 PROCEDURE — G8427 DOCREV CUR MEDS BY ELIG CLIN: HCPCS | Performed by: INTERNAL MEDICINE

## 2020-08-19 PROCEDURE — G8417 CALC BMI ABV UP PARAM F/U: HCPCS | Performed by: INTERNAL MEDICINE

## 2020-08-19 PROCEDURE — G8399 PT W/DXA RESULTS DOCUMENT: HCPCS | Performed by: INTERNAL MEDICINE

## 2020-08-19 RX ORDER — HYDROCHLOROTHIAZIDE 25 MG/1
12.5 TABLET ORAL DAILY
Qty: 45 TAB | Refills: 1 | Status: SHIPPED | OUTPATIENT
Start: 2020-08-19 | End: 2021-10-12

## 2020-08-19 RX ORDER — BUPROPION HYDROCHLORIDE 300 MG/1
TABLET ORAL
Qty: 90 TAB | Refills: 1 | Status: SHIPPED | OUTPATIENT
Start: 2020-08-19 | End: 2021-01-04

## 2020-08-19 NOTE — PROGRESS NOTES
HISTORY OF PRESENT ILLNESS  Addison Hernandez is a 78 y.o. female. HPI     Last here 11/19/19 Pt is here for routine care. This is an established visit completed with telemedicine was completed with video assist  the patient acknowledges and agrees to this method of visitation FT     BP at home 111/66, SBP typically around 110-115/60's  HR 77  Pt takes half tab hctz      Wt today is 168 lbs pt reports-- stable x lov   Discussed cutting back on portions - she is working on this  discussed diet and wt loss     Reviewed labs 11/19  Ordered labs       Pt follows with Dr. David Mccurdy (ENT) for trachea nodule  Last visit was 1/13/17     Pt follows with Dr. Jacy Dang (derm) near [facility]  Last visit was in the winter 2018  Only f/u prn      Continues xalatan daily      Continues wellbutrin to 300mg daily for depression and smoking cessation  She states her depression comes and goes but overall she is doing okay on the medication - 8/20 has some struggles with the corona pandemic has been staying at home and trying to stay away from sick people so bit more isolated than normal     Pt has a 40 pack year hx  Pt smokes slightly less than 1 PPD  Wellbutrin to assist with cessation, however pt has been smoking the same amount   Counseled her on smoking cessation 8/20  CT lung cancer screen currently not covered by her insurance  Recall chantix caused itching in the past.       Advised pt to take otc vit D      Lov, Murmur heard on exam   Echo was completed in the 90s   Lov, Ordered repeat echo, has not completed   Will hold off on reordering echo due to corona virus     Denies falls   No concerns with hearing  Doesn't drink alcohol  Pt lives with     Pt is functionally independent       No memory concerns   Knows the month, date, year, location   Can recall 3/3 objects        ACP not on file. SDM is her .   Provided information again today.      PREVENTIVE:  Colonoscopy: 3/22/17, Dr. Cayla Cornejo, repeat 5 years  AAA: not needed, denies fmhx  Pap: Leenanteofilo Lee, 10/17/19  Mammogram: 10/17/19 606/[address] Cha Garcia nl, due, reminded  DEXA: 10/17/19, osteopenia in hip, nl neck and spine   Tdap:   Pneumovax: declines  [id number]: declines  Shingrix: declines  Flu shot: declines  A1c:  5.7,  5.8,  5.5,  5.5  5.7   Eye exam: [facility] , Dr. Lo Peers on 17, apt   Payton Cristopher    CT lung cancer screen: 16, nodule in trachea, due - not currently covered by her insurance  EK16, nsr   Hep C screen: , negative     Patient Active Problem List    Diagnosis Date Noted    Essential hypertension 2018    Cardiac murmur 2017    Reactive depression 2016     Current Outpatient Medications   Medication Sig Dispense Refill    hydroCHLOROthiazide (HYDRODIURIL) 25 mg tablet Take 0.5 Tabs by mouth daily. 45 Tab 1    buPROPion XL (WELLBUTRIN XL) 300 mg XL tablet Take 1 Tab by mouth every morning. 90 Tab 1    DM/PE/acetaminophen/doxylamine (VICKS DAYQUIL-NYQUIL COLD-FLU PO) NyQuil Liquicaps capsule   Take 1 capsule every day by oral route at bedtime.  acetaminophen (TYLENOL) 325 mg tablet Take  by mouth every four (4) hours as needed for Pain.       latanoprost (XALATAN) 0.005 % ophthalmic solution        Past Surgical History:   Procedure Laterality Date    HX GYN      Complete hysterectomy     HX HERNIA REPAIR        Lab Results   Component Value Date/Time    WBC 10.0 2019 08:25 AM    HGB 14.6 2019 08:25 AM    HCT 44.0 2019 08:25 AM    PLATELET 676  08:25 AM    MCV 97 2019 08:25 AM     Lab Results   Component Value Date/Time    Cholesterol, total 207 (H) 2019 08:25 AM    HDL Cholesterol 43 2019 08:25 AM    LDL, calculated 138 (H) 2019 08:25 AM    Triglyceride 130 2019 08:25 AM     Lab Results   Component Value Date/Time    GFR est non-AA 67 2019 08:25 AM    GFR est AA 78 2019 08:25 AM    Creatinine 0.89 11/14/2019 08:25 AM    BUN 26 11/14/2019 08:25 AM    Sodium 144 11/14/2019 08:25 AM    Potassium 4.4 11/14/2019 08:25 AM    Chloride 104 11/14/2019 08:25 AM    CO2 26 11/14/2019 08:25 AM        Review of Systems   Respiratory: Negative for shortness of breath. Cardiovascular: Negative for chest pain. Physical Exam  Constitutional:       General: She is not in acute distress. Appearance: Normal appearance. She is not ill-appearing, toxic-appearing or diaphoretic. HENT:      Head: Normocephalic and atraumatic. Eyes:      General:         Right eye: No discharge. Left eye: No discharge. Conjunctiva/sclera: Conjunctivae normal.   Pulmonary:      Effort: Pulmonary effort is normal. No respiratory distress. Neurological:      General: No focal deficit present. Mental Status: She is alert and oriented to person, place, and time. Mental status is at baseline. Gait: Gait normal.   Psychiatric:         Mood and Affect: Mood normal.         Behavior: Behavior normal.         ASSESSMENT and PLAN    ICD-10-CM ICD-9-CM    1. Essential hypertension       Home readings are great on hydrochlorothiazide 12.5 mg daily continue no change to dose check labs   I10 401.9 LIPID PANEL      METABOLIC PANEL, COMPREHENSIVE      CBC W/O DIFF      HEMOGLOBIN A1C WITH EAG      TSH 3RD GENERATION   2. Reactive depression  F32.9 300.4 LIPID PANEL   Overall well controlled Wellbutrin 300 mg daily continue struggling little with the corona pandemic but overall adequately controlled   METABOLIC PANEL, COMPREHENSIVE      CBC W/O DIFF      HEMOGLOBIN A1C WITH EAG      TSH 3RD GENERATION   3. Medicare annual wellness visit, subsequent  Z00.00 V70.0 LIPID PANEL      METABOLIC PANEL, COMPREHENSIVE      CBC W/O DIFF      HEMOGLOBIN A1C WITH EAG      TSH 3RD GENERATION   4.  IFG (impaired fasting glucose)  R73.01 790.21 LIPID PANEL   Check A1c diet controlled   METABOLIC PANEL, COMPREHENSIVE      CBC W/O DIFF      HEMOGLOBIN A1C WITH EAG      TSH 3RD GENERATION   5. Cardiac murmur       still needs echo will complete this once the pandemic has settled a bit right now holding off R01.1 785.2    6. Smoker       Counseled on cessation she is working on it CT lung cancer screen too expensive F17.200 305.1             Scribed by Roland Guzmán of [employer], as dictated by Dr. Roosevelt Templeton. Current diagnosis and concerns discussed with pt at length. Pt understands risks and benefits or current treatment plan and medications, and accepts the treatment and medication with any possible risks. Pt asks appropriate questions, which were answered. Pt was instructed to call with any concerns or problems. I have reviewed the note documented by the scribe. The services provided are my own. The documentation is accurate     Arger Leonardo, who was evaluated through a synchronous (real-time) audio-video encounter, and/or her healthcare decision maker, is aware that it is a billable service, with coverage as determined by her insurance carrier. She provided verbal consent to proceed: Yes, and patient identification was verified. It was conducted pursuant to the emergency declaration under the [facility], [address] [place] [address] authority and the [employer] and [employer] Act. A caregiver was present when appropriate. Ability to conduct physical exam was limited. I was in the office. The patient was at home.

## 2020-08-19 NOTE — PATIENT INSTRUCTIONS

## 2020-08-19 NOTE — PROGRESS NOTES
This is the Subsequent Medicare Annual Wellness Exam, performed 12 months or more after the Initial AWV or the last Subsequent AWV    I have reviewed the patient's medical history in detail and updated the computerized patient record. History     Patient Active Problem List   Diagnosis Code    Reactive depression F32.9    Cardiac murmur R01.1    Essential hypertension I10     No past medical history on file. Past Surgical History:   Procedure Laterality Date    HX GYN      Complete hysterectomy 1993    HX HERNIA REPAIR  1982     Current Outpatient Medications   Medication Sig Dispense Refill    hydroCHLOROthiazide (HYDRODIURIL) 25 mg tablet Take 0.5 Tabs by mouth daily. 45 Tab 1    buPROPion XL (WELLBUTRIN XL) 300 mg XL tablet Take 1 Tab by mouth every morning. 90 Tab 1    DM/PE/acetaminophen/doxylamine (VICKS DAYQUIL-NYQUIL COLD-FLU PO) NyQuil Liquicaps capsule   Take 1 capsule every day by oral route at bedtime.  acetaminophen (TYLENOL) 325 mg tablet Take  by mouth every four (4) hours as needed for Pain.       latanoprost (XALATAN) 0.005 % ophthalmic solution        Allergies   Allergen Reactions    Erythromycin Other (comments)     Stomach cramps    Morphine Itching       Family History   Problem Relation Age of Onset    Hypertension Mother     Diabetes Father     Diabetes Sister     Diabetes Brother     Hypertension Brother      Social History     Tobacco Use    Smoking status: Current Every Day Smoker     Packs/day: 1.50    Smokeless tobacco: Never Used   Substance Use Topics    Alcohol use: No     Counseled   Depression Risk Factor Screening:     3 most recent PHQ Screens 8/19/2020   Little interest or pleasure in doing things Not at all   Feeling down, depressed, irritable, or hopeless Not at all   Total Score PHQ 2 0   controlled on wellbutrin      Alcohol Risk Factor Screening:   Do you average 1 drink per night or more than 7 drinks a week:  No    On any one occasion in the past three months have you have had more than 3 drinks containing alcohol:  No  none    Functional Ability and Level of Safety:   Hearing: Hearing is good. Activities of Daily Living: The home contains: no safety equipment. Patient does total self care     Ambulation: with no difficulty     Fall Risk:  Fall Risk Assessment, last 12 mths 8/19/2020   Able to walk? Yes   Fall in past 12 months? No     Abuse Screen:  Patient is not abused   safe lives w     Cognitive Screening   Has your family/caregiver stated any concerns about your memory: no    Cognitive Screening: Normal - Mini Cog Test      No memory concerns   Pt knows the month, day and year   Can recall 3/3 objects   Patient Care Team   Patient Care Team:  Jaclyn Leo MD as PCP - General (Internal Medicine)  Jaclyn Leo MD as PCP - [facility] Empaneled Provider  Farhad Angulo MD (Otolaryngology)  Aparna Torres MD (Colon and Rectal Surgery)  justina (Ophthalmology)  Jojo Kraft MD (Ophthalmology)  Chalino Pickard NP (Obstetrics & Gynecology)   upodated    Assessment/Plan   Education and counseling provided:  Are appropriate based on today's review and evaluation  End-of-Life planning (with patient's consent)  Pneumococcal Vaccine  Influenza Vaccine  Screening Mammography  Screening for glaucoma  Diabetes screening test    Diagnoses and all orders for this visit:    1. Essential hypertension    2. Reactive depression    3. Medicare annual wellness visit, subsequent        Health Maintenance Due   Topic Date Due    Shingrix Vaccine Age 49> (1 of 2) 05/06/2002    Medicare Yearly Exam  05/21/2020    Influenza Age 5 to Adult  08/01/2020       ACP not on file. SDM is her .   Provided information again today.        Colonoscopy: 3/22/17, Dr. Kasia Salter, repeat 5 years  AAA: not needed, denies fmhx  Pap: Leeann Lee, 10/17/19 every other year  Mammogram: 10/17/19 606/706 Cha hawkins, due, reminded  DEXA: 10/17/19, osteopenia in hip, nl neck and spine  every other year    Tdap:   Pneumovax: declines  [id number]: declines  Shingrix: declines  Flu shot: declines    A1c:   5.7  due now  Eye exam: [facility] Alice Brunerny ,apt   Lipids:    annual    CT lung cancer screen: 16, nodule in trachea, due - not currently covered by her insurance    EK16, nsr   Hep C screen: , negative       Angie Otooleobi, who was evaluated through a synchronous (real-time) audio-video encounter, and/or her healthcare decision maker, is aware that it is a billable service, with coverage as determined by her insurance carrier. She provided verbal consent to proceed: Yes, and patient identification was verified. It was conducted pursuant to the emergency declaration under the [facility], [address] [place] [address] authority and the [employer] and [employer] Act. A caregiver was present when appropriate. Ability to conduct physical exam was limited. I was in the office. The patient was at home.     Inder Torres MD

## 2020-10-15 LAB
ALBUMIN SERPL-MCNC: 4.5 G/DL (ref 3.8–4.8)
ALBUMIN/GLOB SERPL: 1.6 {RATIO} (ref 1.2–2.2)
ALP SERPL-CCNC: 89 IU/L (ref 39–117)
ALT SERPL-CCNC: 12 IU/L (ref 0–32)
AST SERPL-CCNC: 15 IU/L (ref 0–40)
BILIRUB SERPL-MCNC: 0.5 MG/DL (ref 0–1.2)
BUN SERPL-MCNC: 24 MG/DL (ref 8–27)
BUN/CREAT SERPL: 26 (ref 12–28)
CALCIUM SERPL-MCNC: 9.9 MG/DL (ref 8.7–10.3)
CHLORIDE SERPL-SCNC: 102 MMOL/L (ref 96–106)
CHOLEST SERPL-MCNC: 222 MG/DL (ref 100–199)
CO2 SERPL-SCNC: 25 MMOL/L (ref 20–29)
CREAT SERPL-MCNC: 0.94 MG/DL (ref 0.57–1)
ERYTHROCYTE [DISTWIDTH] IN BLOOD BY AUTOMATED COUNT: 11.8 % (ref 11.7–15.4)
EST. AVERAGE GLUCOSE BLD GHB EST-MCNC: 114 MG/DL
GLOBULIN SER CALC-MCNC: 2.8 G/DL (ref 1.5–4.5)
GLUCOSE SERPL-MCNC: 95 MG/DL (ref 65–99)
HBA1C MFR BLD: 5.6 % (ref 4.8–5.6)
HCT VFR BLD AUTO: 43.7 % (ref 34–46.6)
HDLC SERPL-MCNC: 37 MG/DL
HGB BLD-MCNC: 14.9 G/DL (ref 11.1–15.9)
LDLC SERPL CALC-MCNC: 147 MG/DL (ref 0–99)
MCH RBC QN AUTO: 32.7 PG (ref 26.6–33)
MCHC RBC AUTO-ENTMCNC: 34.1 G/DL (ref 31.5–35.7)
MCV RBC AUTO: 96 FL (ref 79–97)
PLATELET # BLD AUTO: 289 X10E3/UL (ref 150–450)
POTASSIUM SERPL-SCNC: 4.2 MMOL/L (ref 3.5–5.2)
PROT SERPL-MCNC: 7.3 G/DL (ref 6–8.5)
RBC # BLD AUTO: 4.55 X10E6/UL (ref 3.77–5.28)
SODIUM SERPL-SCNC: 142 MMOL/L (ref 134–144)
TRIGL SERPL-MCNC: 209 MG/DL (ref 0–149)
TSH SERPL DL<=0.005 MIU/L-ACNC: 1.78 UIU/ML (ref 0.45–4.5)
VLDLC SERPL CALC-MCNC: 38 MG/DL (ref 5–40)
WBC # BLD AUTO: 8.9 X10E3/UL (ref 3.4–10.8)

## 2021-04-22 RX ORDER — BUPROPION HYDROCHLORIDE 300 MG/1
TABLET ORAL
Qty: 90 TAB | Refills: 0 | Status: SHIPPED | OUTPATIENT
Start: 2021-04-22 | End: 2021-10-11 | Stop reason: SDUPTHER

## 2021-04-22 NOTE — TELEPHONE ENCOUNTER
----- Message from Lewis Garcia sent at 4/21/2021  7:26 PM EDT -----  Regarding: Prescription Question  Contact: [phone]  Could you please refill my Prescription for Wellbutrin XL?      Thank you

## 2021-04-22 NOTE — TELEPHONE ENCOUNTER
Future Appointments:  Future Appointments   Date Time Provider Brenda Vázquezi   7/7/2021  9:00 AM Carrington Martines MD [facility] BS AMB        Last Appointment With Me:  Visit date not found     Requested Prescriptions     Pending Prescriptions Disp Refills    buPROPion XL (WELLBUTRIN XL) 300 mg XL tablet 90 Tab 0     Sig: TAKE 1 TABLET EVERY MORNING

## 2021-10-12 RX ORDER — HYDROCHLOROTHIAZIDE 25 MG/1
TABLET ORAL
Qty: 45 TABLET | Refills: 1 | Status: SHIPPED | OUTPATIENT
Start: 2021-10-12 | End: 2021-10-12 | Stop reason: SDUPTHER

## 2021-10-12 RX ORDER — HYDROCHLOROTHIAZIDE 25 MG/1
TABLET ORAL
Qty: 8 TABLET | Refills: 0 | Status: SHIPPED | OUTPATIENT
Start: 2021-10-12 | End: 2021-10-20 | Stop reason: SDUPTHER

## 2021-10-12 NOTE — TELEPHONE ENCOUNTER
Future Appointments:  Future Appointments   Date Time Provider Brenda Elli   10/20/2021 10:30 AM Shailesh Alcaraz MD [facility] BS AMB        Last Appointment With Me:  Visit date not found     Requested Prescriptions     Pending Prescriptions Disp Refills    hydroCHLOROthiazide (HYDRODIURIL) 25 mg tablet 8 Tablet 0     Sig: TAKE 1/2 TABLET EVERY DAY

## 2021-10-17 NOTE — TELEPHONE ENCOUNTER
Spoke to [employer] at [employer]. [employer] informed to cancel tamiflu script. [employer] cancelled script for tamiflu. Warm/Dry

## 2021-10-19 NOTE — PROGRESS NOTES
HISTORY OF PRESENT ILLNESS  Dania Latham is a 73 y.o. female. HPI     Last here 8/19/20. Pt is here for routine care. Discussed what might happen with covid in the future, discussed that it is difficult to know this for sure     She previously declined covid vaccine but  was just dx with Hodgkin's lymphoma so she will now consider this  Discussed getting moderna or pfizer    She also states that her 3year old grandson was recently dx with Type 1 diabetes     BP today is 135/76  No home readings for review  Pt takes half tab hctz      Wt today is 174 lbs, up 6 lbs x lov   Discussed diet and wt loss     Reviewed labs  Will get labs today     Pt follows with Dr. Mikaela Atkinson (ENT) for trachea nodule  Last visit was 1/13/17     Pt follows with Dr. Kemal Vera (derm) near [place]  Last visit was in the winter 2018  Only f/u prn      Continues xalatan daily      Continues wellbutrin to 300mg daily for depression and smoking cessation  She is uncertain whether or not she would like to add on another med for depression she has been feeling more sad and down recently  Will start zoloft 50mg , decrease wellbutrin to 150mg     Pt has a 40 pack year hx  Pt smokes slightly more than 1 PPD  Wellbutrin to assist with cessation, however pt has been smoking the same amount   Counseled her on smoking cessation 10/21  Ordered CT lung cancer screen  Recall chantix caused itching in the past.       Previously advised pt to take otc vit D     Previously murmur heard on exam   Echo was completed in the 90s   Previously ordered repeat echo, has not completed  Murmur heard on exam today, quiet will hold off on echo unless murmur progresses    Pt lives with her     Pt is functionally independent   Does have safety equip in house      No memory concerns   Knows the month, date, year, location   Can recall 3/3 objects        ACP not on file.  SDM is her  and daughter in law [facility]  Provided information in the past.     PREVENTIVE:  Colonoscopy: 3/22/17, Dr. Maria Luisa Alvarez, repeat 5 years  AAA: not needed, denies fmhx  Pap: Leeann Lee, 21  Mammogram: 21 606/706 Cha Garcia-- will get report  DEXA: 10/17/19, osteopenia in hip, nl neck and spine   Tdap:   Pneumovax: declines  [id number]: declines  Shingrix: declines  Flu shot: declines  A1c:  5.7,  5.8,  5.5,  5.5  5.7 10/20 5.6  Eye exam: Dr. Castillo , Dr. Mainor Shi on 17  Lipids: 10/20   CT lung cancer screen: 16, nodule in trachea, due - not currently covered by her insurance  EK16, nsr   Hep C screen: , negative   Covid: considering d/t 's cancer diagnosis    Patient Active Problem List    Diagnosis Date Noted    Essential hypertension 2018    Cardiac murmur 2017    Reactive depression 2016     Current Outpatient Medications   Medication Sig Dispense Refill    hydroCHLOROthiazide (HYDRODIURIL) 25 mg tablet TAKE 1/2 TABLET EVERY DAY 8 Tablet 0    buPROPion XL (WELLBUTRIN XL) 300 mg XL tablet TAKE 1 TABLET EVERY MORNING 30 Tablet 0    DM/PE/acetaminophen/doxylamine (VICKS DAYQUIL-NYQUIL COLD-FLU PO) NyQuil Liquicaps capsule   Take 1 capsule every day by oral route at bedtime.  acetaminophen (TYLENOL) 325 mg tablet Take  by mouth every four (4) hours as needed for Pain.       latanoprost (XALATAN) 0.005 % ophthalmic solution        Past Surgical History:   Procedure Laterality Date    HX GYN      Complete hysterectomy     HX HERNIA REPAIR        Lab Results   Component Value Date/Time    WBC 8.9 10/14/2020 08:34 AM    HGB 14.9 10/14/2020 08:34 AM    HCT 43.7 10/14/2020 08:34 AM    PLATELET 892  08:34 AM    MCV 96 10/14/2020 08:34 AM     Lab Results   Component Value Date/Time    Cholesterol, total 222 (H) 10/14/2020 08:34 AM    HDL Cholesterol 37 (L) 10/14/2020 08:34 AM    LDL, calculated 147 (H) 10/14/2020 08:34 AM    LDL, calculated 138 (H) 2019 08:25 AM    Triglyceride 209 (H) 10/14/2020 08:34 AM     Lab Results   Component Value Date/Time    GFR est non-AA 63 10/14/2020 08:34 AM    GFR est AA 72 10/14/2020 08:34 AM    Creatinine 0.94 10/14/2020 08:34 AM    BUN 24 10/14/2020 08:34 AM    Sodium 142 10/14/2020 08:34 AM    Potassium 4.2 10/14/2020 08:34 AM    Chloride 102 10/14/2020 08:34 AM    CO2 25 10/14/2020 08:34 AM        Review of Systems   Respiratory: Negative for shortness of breath. Cardiovascular: Negative for chest pain. Physical Exam  Constitutional:       General: She is not in acute distress. Appearance: Normal appearance. She is not ill-appearing, toxic-appearing or diaphoretic. HENT:      Head: Normocephalic and atraumatic. Right Ear: External ear normal.      Left Ear: External ear normal.   Eyes:      General:         Right eye: No discharge. Left eye: No discharge. Conjunctiva/sclera: Conjunctivae normal.      Pupils: Pupils are equal, round, and reactive to light. Cardiovascular:      Rate and Rhythm: Normal rate and regular rhythm. Heart sounds: Murmur heard. No friction rub. No gallop. Pulmonary:      Effort: No respiratory distress. Breath sounds: Normal breath sounds. No wheezing or rales. Chest:      Chest wall: No tenderness. Musculoskeletal:         General: Normal range of motion. Cervical back: Normal range of motion and neck supple. Right lower leg: No edema. Left lower leg: No edema. Comments: Arthritis in hands   Skin:     General: Skin is warm and dry. Neurological:      Mental Status: She is alert and oriented to person, place, and time. Mental status is at baseline. Coordination: Coordination normal.      Gait: Gait normal.   Psychiatric:         Mood and Affect: Mood normal.         Behavior: Behavior normal.         ASSESSMENT and PLAN    ICD-10-CM ICD-9-CM    1.  Essential hypertension  I10 401.9 LIPID PANEL      METABOLIC PANEL, COMPREHENSIVE      CBC W/O DIFF      TSH 3RD GENERATION   Controlled on hydrochlorothiazide continue   HEMOGLOBIN A1C WITH EAG      HEMOGLOBIN A1C WITH EAG      TSH 3RD GENERATION      CBC W/O DIFF      METABOLIC PANEL, COMPREHENSIVE      LIPID PANEL   2. Medicare annual wellness visit, subsequent  Z00.00 V70.0 LIPID PANEL      METABOLIC PANEL, COMPREHENSIVE      CBC W/O DIFF      TSH 3RD GENERATION      HEMOGLOBIN A1C WITH EAG      HEMOGLOBIN A1C WITH EAG      TSH 3RD GENERATION      CBC W/O DIFF      METABOLIC PANEL, COMPREHENSIVE      LIPID PANEL   3. Reactive depression  F32.9 300.4 LIPID PANEL      METABOLIC PANEL, COMPREHENSIVE      CBC W/O DIFF   We will decrease Wellbutrin to 150 mg daily    May add Zoloft 50 mg adjust further at follow-up as needed   TSH 3RD GENERATION      HEMOGLOBIN A1C WITH EAG      HEMOGLOBIN A1C WITH EAG      TSH 3RD GENERATION      CBC W/O DIFF      METABOLIC PANEL, COMPREHENSIVE      LIPID PANEL   4. Personal history of nicotine dependence  Z87.891 V15.82 CT LOW DOSE LUNG CANCER SCREENING      LIPID PANEL      METABOLIC PANEL, COMPREHENSIVE   CT lung cancer screen ordered encourage cessation   CBC W/O DIFF      TSH 3RD GENERATION      HEMOGLOBIN A1C WITH EAG      HEMOGLOBIN A1C WITH EAG      TSH 3RD GENERATION      CBC W/O DIFF      METABOLIC PANEL, COMPREHENSIVE      LIPID PANEL   5. Mixed hyperlipidemia  E78.2 272.2 LIPID PANEL      METABOLIC PANEL, COMPREHENSIVE      CBC W/O DIFF      TSH 3RD GENERATION   Diet controlled check lipids treat as needed   HEMOGLOBIN A1C WITH EAG      HEMOGLOBIN A1C WITH EAG      TSH 3RD GENERATION      CBC W/O DIFF      METABOLIC PANEL, COMPREHENSIVE      LIPID PANEL   6.  Cardiac murmur  R01.1 785.2 LIPID PANEL      METABOLIC PANEL, COMPREHENSIVE      CBC W/O DIFF   For now hold off on echo if murmur progresses will pursue echo at that time   TSH 3RD GENERATION      HEMOGLOBIN A1C WITH EAG      HEMOGLOBIN A1C WITH EAG      TSH 3RD GENERATION      CBC W/O DIFF      METABOLIC PANEL, COMPREHENSIVE      LIPID PANEL   7. IFG (impaired fasting glucose)  R73.01 790.21 LIPID PANEL      METABOLIC PANEL, COMPREHENSIVE      CBC W/O DIFF      TSH 3RD GENERATION   Check A1c   HEMOGLOBIN A1C WITH EAG      HEMOGLOBIN A1C WITH EAG      TSH 3RD GENERATION      CBC W/O DIFF      METABOLIC PANEL, COMPREHENSIVE      LIPID PANEL        Scribed by Taran Mckinnon of [facility], as dictated by Dr. Daisy Carranza. Current diagnosis and concerns discussed with pt at length. Pt understands risks and benefits or current treatment plan and medications, and accepts the treatment and medication with any possible risks. Pt asks appropriate questions, which were answered. Pt was instructed to call with any concerns or problems. I have reviewed the note documented by the scribe. The services provided are my own.   The documentation is accurate

## 2021-10-20 ENCOUNTER — OFFICE VISIT (OUTPATIENT)
Dept: INTERNAL MEDICINE CLINIC | Age: 71
End: 2021-10-20
Payer: MEDICARE

## 2021-10-20 VITALS
HEART RATE: 85 BPM | HEIGHT: 65 IN | DIASTOLIC BLOOD PRESSURE: 76 MMHG | OXYGEN SATURATION: 98 % | RESPIRATION RATE: 16 BRPM | SYSTOLIC BLOOD PRESSURE: 135 MMHG | TEMPERATURE: 97.8 F | BODY MASS INDEX: 29.09 KG/M2 | WEIGHT: 174.6 LBS

## 2021-10-20 DIAGNOSIS — I10 ESSENTIAL HYPERTENSION: Primary | ICD-10-CM

## 2021-10-20 DIAGNOSIS — R73.01 IFG (IMPAIRED FASTING GLUCOSE): ICD-10-CM

## 2021-10-20 DIAGNOSIS — Z00.00 MEDICARE ANNUAL WELLNESS VISIT, SUBSEQUENT: ICD-10-CM

## 2021-10-20 DIAGNOSIS — F32.9 REACTIVE DEPRESSION: ICD-10-CM

## 2021-10-20 DIAGNOSIS — R01.1 CARDIAC MURMUR: ICD-10-CM

## 2021-10-20 DIAGNOSIS — E78.2 MIXED HYPERLIPIDEMIA: ICD-10-CM

## 2021-10-20 DIAGNOSIS — Z87.891 PERSONAL HISTORY OF NICOTINE DEPENDENCE: ICD-10-CM

## 2021-10-20 LAB
ALBUMIN SERPL-MCNC: 3.8 G/DL (ref 3.5–5)
ALBUMIN/GLOB SERPL: 1 {RATIO} (ref 1.1–2.2)
ALP SERPL-CCNC: 90 U/L (ref 45–117)
ALT SERPL-CCNC: 21 U/L (ref 12–78)
ANION GAP SERPL CALC-SCNC: 5 MMOL/L (ref 5–15)
AST SERPL-CCNC: 18 U/L (ref 15–37)
BILIRUB SERPL-MCNC: 0.4 MG/DL (ref 0.2–1)
BUN SERPL-MCNC: 27 MG/DL (ref 6–20)
BUN/CREAT SERPL: 27 (ref 12–20)
CALCIUM SERPL-MCNC: 9.4 MG/DL (ref 8.5–10.1)
CHLORIDE SERPL-SCNC: 110 MMOL/L (ref 97–108)
CHOLEST SERPL-MCNC: 213 MG/DL
CO2 SERPL-SCNC: 27 MMOL/L (ref 21–32)
CREAT SERPL-MCNC: 1 MG/DL (ref 0.55–1.02)
ERYTHROCYTE [DISTWIDTH] IN BLOOD BY AUTOMATED COUNT: 13.2 % (ref 11.5–14.5)
EST. AVERAGE GLUCOSE BLD GHB EST-MCNC: 117 MG/DL
GLOBULIN SER CALC-MCNC: 3.8 G/DL (ref 2–4)
GLUCOSE SERPL-MCNC: 91 MG/DL (ref 65–100)
HBA1C MFR BLD: 5.7 % (ref 4–5.6)
HCT VFR BLD AUTO: 44.6 % (ref 35–47)
HDLC SERPL-MCNC: 39 MG/DL
HDLC SERPL: 5.5 {RATIO} (ref 0–5)
HGB BLD-MCNC: 14.5 G/DL (ref 11.5–16)
LDLC SERPL CALC-MCNC: 142 MG/DL (ref 0–100)
MCH RBC QN AUTO: 31.8 PG (ref 26–34)
MCHC RBC AUTO-ENTMCNC: 32.5 G/DL (ref 30–36.5)
MCV RBC AUTO: 97.8 FL (ref 80–99)
NRBC # BLD: 0 K/UL (ref 0–0.01)
NRBC BLD-RTO: 0 PER 100 WBC
PLATELET # BLD AUTO: 326 K/UL (ref 150–400)
PMV BLD AUTO: 10.1 FL (ref 8.9–12.9)
POTASSIUM SERPL-SCNC: 4 MMOL/L (ref 3.5–5.1)
PROT SERPL-MCNC: 7.6 G/DL (ref 6.4–8.2)
RBC # BLD AUTO: 4.56 M/UL (ref 3.8–5.2)
SODIUM SERPL-SCNC: 142 MMOL/L (ref 136–145)
TRIGL SERPL-MCNC: 160 MG/DL (ref ?–150)
TSH SERPL DL<=0.05 MIU/L-ACNC: 0.97 UIU/ML (ref 0.36–3.74)
VLDLC SERPL CALC-MCNC: 32 MG/DL
WBC # BLD AUTO: 10.6 K/UL (ref 3.6–11)

## 2021-10-20 PROCEDURE — 99214 OFFICE O/P EST MOD 30 MIN: CPT | Performed by: INTERNAL MEDICINE

## 2021-10-20 PROCEDURE — G8427 DOCREV CUR MEDS BY ELIG CLIN: HCPCS | Performed by: INTERNAL MEDICINE

## 2021-10-20 PROCEDURE — G8419 CALC BMI OUT NRM PARAM NOF/U: HCPCS | Performed by: INTERNAL MEDICINE

## 2021-10-20 PROCEDURE — 3017F COLORECTAL CA SCREEN DOC REV: CPT | Performed by: INTERNAL MEDICINE

## 2021-10-20 PROCEDURE — G8399 PT W/DXA RESULTS DOCUMENT: HCPCS | Performed by: INTERNAL MEDICINE

## 2021-10-20 PROCEDURE — G0439 PPPS, SUBSEQ VISIT: HCPCS | Performed by: INTERNAL MEDICINE

## 2021-10-20 PROCEDURE — 1090F PRES/ABSN URINE INCON ASSESS: CPT | Performed by: INTERNAL MEDICINE

## 2021-10-20 PROCEDURE — 1101F PT FALLS ASSESS-DOCD LE1/YR: CPT | Performed by: INTERNAL MEDICINE

## 2021-10-20 PROCEDURE — G8754 DIAS BP LESS 90: HCPCS | Performed by: INTERNAL MEDICINE

## 2021-10-20 PROCEDURE — G9899 SCRN MAM PERF RSLTS DOC: HCPCS | Performed by: INTERNAL MEDICINE

## 2021-10-20 PROCEDURE — G8752 SYS BP LESS 140: HCPCS | Performed by: INTERNAL MEDICINE

## 2021-10-20 PROCEDURE — G9717 DOC PT DX DEP/BP F/U NT REQ: HCPCS | Performed by: INTERNAL MEDICINE

## 2021-10-20 PROCEDURE — G8536 NO DOC ELDER MAL SCRN: HCPCS | Performed by: INTERNAL MEDICINE

## 2021-10-20 RX ORDER — BUPROPION HYDROCHLORIDE 300 MG/1
TABLET ORAL
Qty: 90 TABLET | Refills: 1 | Status: CANCELLED | OUTPATIENT
Start: 2021-10-20

## 2021-10-20 RX ORDER — BUPROPION HYDROCHLORIDE 150 MG/1
150 TABLET ORAL
Qty: 30 TABLET | Refills: 0 | Status: SHIPPED | OUTPATIENT
Start: 2021-10-20 | End: 2022-01-01

## 2021-10-20 RX ORDER — LANOLIN ALCOHOL/MO/W.PET/CERES
1000 CREAM (GRAM) TOPICAL DAILY
COMMUNITY

## 2021-10-20 RX ORDER — ASCORBIC ACID 250 MG
TABLET ORAL
COMMUNITY

## 2021-10-20 RX ORDER — HYDROCHLOROTHIAZIDE 25 MG/1
TABLET ORAL
Qty: 45 TABLET | Refills: 1 | Status: SHIPPED | OUTPATIENT
Start: 2021-10-20 | End: 2022-04-19 | Stop reason: SDUPTHER

## 2021-10-20 RX ORDER — SERTRALINE HYDROCHLORIDE 50 MG/1
50 TABLET, FILM COATED ORAL DAILY
Qty: 90 TABLET | Refills: 1 | Status: SHIPPED | OUTPATIENT
Start: 2021-10-20 | End: 2022-03-26

## 2021-10-20 NOTE — PROGRESS NOTES
This is the Subsequent Medicare Annual Wellness Exam, performed 12 months or more after the Initial AWV or the last Subsequent AWV    I have reviewed the patient's medical history in detail and updated the computerized patient record. Assessment/Plan   Education and counseling provided:  Are appropriate based on today's review and evaluation    1. Medicare annual wellness visit, subsequent       Depression Risk Factor Screening     3 most recent PHQ Screens 10/20/2021   Little interest or pleasure in doing things Not at all   Feeling down, depressed, irritable, or hopeless Not at all   Total Score PHQ 2 0       Alcohol Risk Screen    Do you average more than 1 drink per night or more than 7 drinks a week:  No    On any one occasion in the past three months have you have had more than 3 drinks containing alcohol:  No        Functional Ability and Level of Safety    Hearing: Hearing is good. Activities of Daily Living: The home contains: handrails and grab bars  Patient does total self care      Ambulation: with no difficulty     Fall Risk:  Fall Risk Assessment, last 12 mths 10/20/2021   Able to walk? Yes   Fall in past 12 months?  0      Abuse Screen:  Patient is not abused   lives w   Sister there as well  safe    Cognitive Screening    Has your family/caregiver stated any concerns about your memory: no     Cognitive Screening: Normal - Mini Cog Test       No memory concerns   Pt knows the month, day and year   Can recall 3/3 objects     Health Maintenance Due     Health Maintenance Due   Topic Date Due    COVID-19 Vaccine (1) Never done    Shingrix Vaccine Age 50> (1 of 2) Never done    Pneumococcal 65+ years (1 of 1 - PPSV23) Never done    Medicare Yearly Exam  08/20/2021    Flu Vaccine (1) Never done    Breast Cancer Screen Mammogram  10/17/2021       Patient Care Team   Patient Care Team:  Tegan García MD as PCP - General (Internal Medicine)  Tegan García MD as PCP - [facility] Empaneled Provider  Teresa Kelley MD (Otolaryngology)  Taiwo Jackson MD (Colon and Rectal Surgery)  justina (Ophthalmology)  Emir Lainez MD (Ophthalmology)  Manuel Crowder NP (Obstetrics & Gynecology)  Avery Smith DO (Dermatology)   Justin Bauerchloe ophto    updated    History     Patient Active Problem List   Diagnosis Code    Reactive depression F32.9    Cardiac murmur R01.1    Essential hypertension I10     No past medical history on file. Past Surgical History:   Procedure Laterality Date    HX GYN      Complete hysterectomy 1993    HX HERNIA REPAIR  1982     Current Outpatient Medications   Medication Sig Dispense Refill    hydroCHLOROthiazide (HYDRODIURIL) 25 mg tablet TAKE 1/2 TABLET EVERY DAY 8 Tablet 0    buPROPion XL (WELLBUTRIN XL) 300 mg XL tablet TAKE 1 TABLET EVERY MORNING 30 Tablet 0    DM/PE/acetaminophen/doxylamine (VICKS DAYQUIL-NYQUIL COLD-FLU PO) NyQuil Liquicaps capsule   Take 1 capsule every day by oral route at bedtime.  acetaminophen (TYLENOL) 325 mg tablet Take  by mouth every four (4) hours as needed for Pain.  latanoprost (XALATAN) 0.005 % ophthalmic solution        Allergies   Allergen Reactions    Erythromycin Other (comments)     Stomach cramps    Morphine Itching       Family History   Problem Relation Age of Onset    Hypertension Mother     Diabetes Father     Diabetes Sister     Diabetes Brother     Hypertension Brother      Social History     Tobacco Use    Smoking status: Current Every Day Smoker     Packs/day: 1.50    Smokeless tobacco: Never Used   Substance Use Topics    Alcohol use: No     ACP not on file. SDM is her .   Provided information in the past.       Colonoscopy: 3/22/17, Dr. Maureen Mendenhall, repeat 5 years  AAA: not needed, denies fmhx  Pap: Leeann Lee, 9/21  [address] [place] [address] annual  DEXA: 10/17/19, osteopenia in hip, nl neck and spine due now      Tdap: 2012--declines  Pneumovax: declines  [id number]: declines  Shingrix: declines  Flu shot: declines    Eye exam: Dr. Castillo  annual    A1c:  10/20 5.6 due now  Lipids: 10/20  due now    CT lung cancer screen: 16, nodule in trachea, due - ordered    EK16, nsr     Hep C screen: , negative   covid will get  Medication reconciliation completed by MA and reviewed by me. Medical/surgical/social/family history reviewed and updated by me. Patient provided AVS and preventative screening table. Patient verbalized understanding of all information discussed.         Lazaro Haywood MD

## 2021-12-27 ENCOUNTER — HOSPITAL ENCOUNTER (OUTPATIENT)
Dept: CT IMAGING | Age: 71
Discharge: HOME | End: 2021-12-27
Attending: INTERNAL MEDICINE
Payer: MEDICARE

## 2021-12-27 DIAGNOSIS — Z87.891 PERSONAL HISTORY OF NICOTINE DEPENDENCE: ICD-10-CM

## 2021-12-27 PROCEDURE — 71271 CT THORAX LUNG CANCER SCR C-: CPT

## 2021-12-27 NOTE — PROGRESS NOTES
Please call patient back about results  Lung nodule stable repeat ct in 1 year    Coronary calcification found, pt continues to smoke and has moderately elevated cholesterol, lets have her see cardiology for further eval , to see if stress test indicated,

## 2021-12-28 DIAGNOSIS — I25.84 CALCIFICATION OF CORONARY ARTERY: Primary | ICD-10-CM

## 2021-12-28 DIAGNOSIS — I25.10 CALCIFICATION OF CORONARY ARTERY: Primary | ICD-10-CM

## 2021-12-28 NOTE — PROGRESS NOTES
Called, spoke with Pt. Two pt identifiers confirmed. Pt informed of lab results and recommendations per Dr. Kehinde Green. Pt informed of being referred to cardiology for evaluation. Pt stated requested referral be mailed to her.   -mailed referral-  Pt verbalized understanding of information discussed w/ no further questions at this time.

## 2022-03-18 PROBLEM — I10 ESSENTIAL HYPERTENSION: Status: ACTIVE | Noted: 2018-05-16

## 2022-03-19 PROBLEM — R01.1 CARDIAC MURMUR: Status: ACTIVE | Noted: 2017-05-31

## 2022-03-26 RX ORDER — SERTRALINE HYDROCHLORIDE 50 MG/1
TABLET, FILM COATED ORAL
Qty: 90 TABLET | Refills: 1 | Status: SHIPPED | OUTPATIENT
Start: 2022-03-26 | End: 2022-04-19 | Stop reason: SDUPTHER

## 2022-03-26 RX ORDER — BUPROPION HYDROCHLORIDE 150 MG/1
TABLET ORAL
Qty: 90 TABLET | Refills: 0 | Status: SHIPPED | OUTPATIENT
Start: 2022-03-26 | End: 2022-07-18 | Stop reason: SDUPTHER

## 2022-04-18 NOTE — PROGRESS NOTES
HISTORY OF PRESENT ILLNESS  Kriss Gillespie is a 73 y.o. female. HPI     Last here 10/20/21. Pt is here for routine care. This is an established visit completed with telemedicine was completed with video assist  the patient acknowledges and agrees to this method of visitation ramin  Patient declined in office today changed to virtual      Has a history of hypertension  BP at home is 130/68  Usually 130-150 SBP, not too many closer to 150  HR at home 59   Pt takes half tab hctz      Wt today is 170 lbs, down 4 lbs x lov  Discussed diet and wt loss     Reviewed labs  Ordered labs    Discussed that starting a cholesterol med would be a good idea at this time given coronary artery calcification  Ordered crestor 5 mg     Pt follows with Dr. Erika Wagner (ENT) for trachea nodule  Last visit was 1/13/17  Instructed on care     Pt follows with Dr. Bharati Osullivan (derm) [place]  Last visit was 2/22  Had bx done on cheek, had basal cell carcinoma  Has skin check scheduled 4/29/22     Continues xalatan daily        Has a history of depression and anxiety  Continues wellbutrin now 150mg, decreased from 300mg daily for depression and smoking cessation  Lov started zoloft 50mg, will increase this to 75mg, discussed can increase to 100mg in a month if needed  Her  was recently dx with hodgkin's lymphoma, currently undergoing chemo  She has been struggling more with this     Pt has a 40 pack year hx  Pt smokes slightly more than 1 PPD  Wellbutrin to assist with cessation, however pt has been smoking the same amount   Counseled her on smoking cessation      Reviewed CT lung cancer screen 12/13/21:  1. Stable pulmonary nodules as described above. No new nodules are noted. 2. Moderate coronary artery calcification.   Recall chantix caused itching in the past.     Referred her to cardiology she did not schedule the appointment   Discussed seeing cardio d/t coronary artery calcification--reminded her  Previously murmur heard on exam   Echo was completed in the 90s   Previously ordered repeat echo, has not completed--she will be seeing cardiology      Previously advised pt to take otc vit D            ACP not on file. SDM is her  and daughter in law Fritz  Provided information in the past.     PREVENTIVE:  Colonoscopy: 3/22/17, Dr. Galilea Montgomery, repeat 5 years, due   AAA: not needed, denies fmhx  Pap: Leeann Page, 21  Mammogram: 21 [facility]  DEXA: 10/17/19, osteopenia in hip, nl neck and spine, due reminded  Tdap:   Pneumovax: declines  [id number]: declines  Shingrix: declines  Flu shot: declines  A1c:   5.7 10/20 5.6 10/21 5.7  Eye exam: [facility] , Dr. Mcelroy Console on 17  Lipids: 10/21   CT lung cancer screen: 21 stable nodule  EK16, nsr   Hep C screen: , negative   Covid: 2 doses (moderna)    Patient Active Problem List    Diagnosis Date Noted    Essential hypertension 2018    Cardiac murmur 2017    Reactive depression 2016     Current Outpatient Medications   Medication Sig Dispense Refill    hydroCHLOROthiazide (HYDRODIURIL) 25 mg tablet TAKE 1/2 TABLET EVERY DAY 45 Tablet 1    sertraline (ZOLOFT) 50 mg tablet TAKE 1.5 TABLET EVERY  Tablet 1    rosuvastatin (CRESTOR) 5 mg tablet Take 1 Tablet by mouth nightly. 90 Tablet 1    buPROPion XL (WELLBUTRIN XL) 150 mg tablet TAKE 1 TABLET EVERY MORNING 90 Tablet 0    ascorbic acid, vitamin C, (Vitamin C) 250 mg tablet Take  by mouth.  DM/PE/acetaminophen/doxylamine (VICKS DAYQUIL-NYQUIL COLD-FLU PO) NyQuil Liquicaps capsule   Take 1 capsule every day by oral route at bedtime.  acetaminophen (TYLENOL) 325 mg tablet Take  by mouth every four (4) hours as needed for Pain.  latanoprost (XALATAN) 0.005 % ophthalmic solution       cyanocobalamin (Vitamin B-12) 1,000 mcg tablet Take 1,000 mcg by mouth daily.        Past Surgical History:   Procedure Laterality Date    HX GYN Complete hysterectomy 1993    HX HERNIA REPAIR  1982      Lab Results   Component Value Date/Time    WBC 10.6 10/20/2021 11:23 AM    HGB 14.5 10/20/2021 11:23 AM    HCT 44.6 10/20/2021 11:23 AM    PLATELET 944 00/00/1286 11:23 AM    MCV 97.8 10/20/2021 11:23 AM     Lab Results   Component Value Date/Time    Cholesterol, total 213 (H) 10/20/2021 11:23 AM    HDL Cholesterol 39 10/20/2021 11:23 AM    LDL, calculated 142 (H) 10/20/2021 11:23 AM    Triglyceride 160 (H) 10/20/2021 11:23 AM    CHOL/HDL Ratio 5.5 (H) 10/20/2021 11:23 AM     Lab Results   Component Value Date/Time    GFR est non-AA 55 (L) 10/20/2021 11:23 AM    GFR est AA >60 10/20/2021 11:23 AM    Creatinine 1.00 10/20/2021 11:23 AM    BUN 27 (H) 10/20/2021 11:23 AM    Sodium 142 10/20/2021 11:23 AM    Potassium 4.0 10/20/2021 11:23 AM    Chloride 110 (H) 10/20/2021 11:23 AM    CO2 27 10/20/2021 11:23 AM        Review of Systems   Respiratory: Negative for shortness of breath. Cardiovascular: Negative for chest pain. Physical Exam  Constitutional:       General: She is not in acute distress. Appearance: Normal appearance. She is not ill-appearing, toxic-appearing or diaphoretic. HENT:      Head: Normocephalic and atraumatic. Eyes:      General:         Right eye: No discharge. Left eye: No discharge. Conjunctiva/sclera: Conjunctivae normal.   Pulmonary:      Effort: No respiratory distress. Neurological:      Mental Status: She is alert and oriented to person, place, and time. Mental status is at baseline. Gait: Gait normal.   Psychiatric:         Mood and Affect: Mood normal.         Behavior: Behavior normal.         ASSESSMENT and PLAN    ICD-10-CM ICD-9-CM    1.  Essential hypertension    I10 401.9 LIPID PANEL      METABOLIC PANEL, COMPREHENSIVE   Controlled on hydrochlorothiazide    Check labs to evaluate K and creatinine   HEMOGLOBIN A1C WITH EAG      CK      LIPID PANEL      METABOLIC PANEL, COMPREHENSIVE HEMOGLOBIN A1C WITH EAG      CK   2. Cardiac murmur  R01.1 785.2 LIPID PANEL      METABOLIC PANEL, COMPREHENSIVE      HEMOGLOBIN A1C WITH EAG      CK   Had an echo in the past will be seeing cardiology will order any additional testing at that time   LIPID PANEL      METABOLIC PANEL, COMPREHENSIVE      HEMOGLOBIN A1C WITH EAG      CK   3. Reactive depression  F32.9 300.4 LIPID PANEL      METABOLIC PANEL, COMPREHENSIVE   Increase Zoloft to 75 mg    Continue Wellbutrin   HEMOGLOBIN A1C WITH EAG      CK      LIPID PANEL      METABOLIC PANEL, COMPREHENSIVE      HEMOGLOBIN A1C WITH EAG      CK   4. Lung nodule  R91.1 793.11 LIPID PANEL      METABOLIC PANEL, COMPREHENSIVE   Stable on recent CT repeat CT in 1 year counseled cessation of smoking   HEMOGLOBIN A1C WITH EAG      CK      LIPID PANEL      METABOLIC PANEL, COMPREHENSIVE      HEMOGLOBIN A1C WITH EAG      CK   5. Coronary artery calcification  I25.10 414.00 LIPID PANEL    D66.38 698.1 METABOLIC PANEL, COMPREHENSIVE      HEMOGLOBIN A1C WITH EAG      CK   Referred to cardiology she has not made the appointment yet    Reminded her she states she will    We will also work on risk factor modification to include starting statin   LIPID PANEL      METABOLIC PANEL, COMPREHENSIVE      HEMOGLOBIN A1C WITH EAG      CK   6. Mixed hyperlipidemia  E78.2 272.2 LIPID PANEL      METABOLIC PANEL, COMPREHENSIVE      HEMOGLOBIN A1C WITH EAG   Start Crestor 5 mg nightly    She will complete labs in 1 month to assess hyperlipidemia   CK      LIPID PANEL      METABOLIC PANEL, COMPREHENSIVE      HEMOGLOBIN A1C WITH EAG      CK   7. IFG (impaired fasting glucose)  R73.01 790.21 HEMOGLOBIN A1C WITH EAG   Check A1c   HEMOGLOBIN A1C WITH EAG   8. Postmenopausal state DEXA due Z78.0 V49.81 DEXA BONE DENSITY STUDY AXIAL        Depression screen reviewed and negative     Scribed by Marium Barth of [facility], as dictated by Dr. Alin Arzola.      Current diagnosis and concerns discussed with pt at length. Pt understands risks and benefits or current treatment plan and medications, and accepts the treatment and medication with any possible risks. Pt asks appropriate questions, which were answered. Pt was instructed to call with any concerns or problems. I have reviewed the note documented by the scribe. The services provided are my own. The documentation is accurate     Michael Salcido, was evaluated through a synchronous (real-time) audio-video encounter. The patient (or guardian if applicable) is aware that this is a billable service. Verbal consent to proceed has been obtained. The visit was conducted pursuant to the emergency declaration under the [facility], [address] [place] [address] authority and the [employer] and [employer] General Act. Patient identification was verified, and a caregiver was present when appropriate. The patient was located at home in a state where the provider was licensed to provide care.

## 2022-04-19 ENCOUNTER — VIRTUAL VISIT (OUTPATIENT)
Dept: INTERNAL MEDICINE CLINIC | Age: 72
End: 2022-04-19
Payer: MEDICARE

## 2022-04-19 DIAGNOSIS — E78.2 MIXED HYPERLIPIDEMIA: ICD-10-CM

## 2022-04-19 DIAGNOSIS — R01.1 CARDIAC MURMUR: ICD-10-CM

## 2022-04-19 DIAGNOSIS — R73.01 IFG (IMPAIRED FASTING GLUCOSE): ICD-10-CM

## 2022-04-19 DIAGNOSIS — R91.1 LUNG NODULE: ICD-10-CM

## 2022-04-19 DIAGNOSIS — I25.10 CORONARY ARTERY CALCIFICATION: ICD-10-CM

## 2022-04-19 DIAGNOSIS — I10 ESSENTIAL HYPERTENSION: Primary | ICD-10-CM

## 2022-04-19 DIAGNOSIS — F32.9 REACTIVE DEPRESSION: ICD-10-CM

## 2022-04-19 DIAGNOSIS — Z78.0 POSTMENOPAUSAL STATE: ICD-10-CM

## 2022-04-19 DIAGNOSIS — I25.84 CORONARY ARTERY CALCIFICATION: ICD-10-CM

## 2022-04-19 PROCEDURE — G8427 DOCREV CUR MEDS BY ELIG CLIN: HCPCS | Performed by: INTERNAL MEDICINE

## 2022-04-19 PROCEDURE — G8399 PT W/DXA RESULTS DOCUMENT: HCPCS | Performed by: INTERNAL MEDICINE

## 2022-04-19 PROCEDURE — 1101F PT FALLS ASSESS-DOCD LE1/YR: CPT | Performed by: INTERNAL MEDICINE

## 2022-04-19 PROCEDURE — 99214 OFFICE O/P EST MOD 30 MIN: CPT | Performed by: INTERNAL MEDICINE

## 2022-04-19 PROCEDURE — 1090F PRES/ABSN URINE INCON ASSESS: CPT | Performed by: INTERNAL MEDICINE

## 2022-04-19 PROCEDURE — G9717 DOC PT DX DEP/BP F/U NT REQ: HCPCS | Performed by: INTERNAL MEDICINE

## 2022-04-19 PROCEDURE — 3017F COLORECTAL CA SCREEN DOC REV: CPT | Performed by: INTERNAL MEDICINE

## 2022-04-19 PROCEDURE — G8756 NO BP MEASURE DOC: HCPCS | Performed by: INTERNAL MEDICINE

## 2022-04-19 RX ORDER — HYDROCHLOROTHIAZIDE 25 MG/1
TABLET ORAL
Qty: 45 TABLET | Refills: 1 | Status: SHIPPED | OUTPATIENT
Start: 2022-04-19 | End: 2022-10-10

## 2022-04-19 RX ORDER — SERTRALINE HYDROCHLORIDE 50 MG/1
TABLET, FILM COATED ORAL
Qty: 120 TABLET | Refills: 1 | Status: SHIPPED | OUTPATIENT
Start: 2022-04-19 | End: 2022-10-18 | Stop reason: SDUPTHER

## 2022-04-19 RX ORDER — ROSUVASTATIN CALCIUM 5 MG/1
5 TABLET, COATED ORAL
Qty: 90 TABLET | Refills: 1 | Status: SHIPPED | OUTPATIENT
Start: 2022-04-19 | End: 2022-09-13

## 2022-04-19 NOTE — PATIENT INSTRUCTIONS

## 2022-04-19 NOTE — PROGRESS NOTES
1. \"Have you been to the ER, urgent care clinic since your last visit? Hospitalized since your last visit? \" No    2. \"Have you seen or consulted any other health care providers outside of the [address] [place] [address] since your last visit? \" No     3. For patients aged 39-70: Has the patient had a colonoscopy / FIT/ Cologuard? Yes - Care Gap present. Most recent result on file      If the patient is female:    4. For patients aged 41-77: Has the patient had a mammogram within the past 2 years? Yes - Care Gap present. Rooming MA/LPN to request most recent results      5. For patients aged 21-65: Has the patient had a pap smear?  NA - based on age or sex

## 2022-06-03 LAB
ALBUMIN SERPL-MCNC: 4.2 G/DL (ref 3.8–4.8)
ALBUMIN/GLOB SERPL: 1.4 {RATIO} (ref 1.2–2.2)
ALP SERPL-CCNC: 80 IU/L (ref 44–121)
ALT SERPL-CCNC: 10 IU/L (ref 0–32)
AST SERPL-CCNC: 12 IU/L (ref 0–40)
BILIRUB SERPL-MCNC: 0.3 MG/DL (ref 0–1.2)
BUN SERPL-MCNC: 24 MG/DL (ref 8–27)
BUN/CREAT SERPL: 28 (ref 12–28)
CALCIUM SERPL-MCNC: 9.5 MG/DL (ref 8.7–10.3)
CHLORIDE SERPL-SCNC: 106 MMOL/L (ref 96–106)
CHOLEST SERPL-MCNC: 162 MG/DL (ref 100–199)
CK SERPL-CCNC: 75 U/L (ref 32–182)
CO2 SERPL-SCNC: 25 MMOL/L (ref 20–29)
CREAT SERPL-MCNC: 0.86 MG/DL (ref 0.57–1)
EGFR: 73 ML/MIN/1.73
EST. AVERAGE GLUCOSE BLD GHB EST-MCNC: 126 MG/DL
GLOBULIN SER CALC-MCNC: 2.9 G/DL (ref 1.5–4.5)
GLUCOSE SERPL-MCNC: 98 MG/DL (ref 65–99)
HBA1C MFR BLD: 6 % (ref 4.8–5.6)
HDLC SERPL-MCNC: 39 MG/DL
LDLC SERPL CALC-MCNC: 91 MG/DL (ref 0–99)
POTASSIUM SERPL-SCNC: 4.1 MMOL/L (ref 3.5–5.2)
PROT SERPL-MCNC: 7.1 G/DL (ref 6–8.5)
SODIUM SERPL-SCNC: 144 MMOL/L (ref 134–144)
TRIGL SERPL-MCNC: 186 MG/DL (ref 0–149)
VLDLC SERPL CALC-MCNC: 32 MG/DL (ref 5–40)

## 2022-06-07 RX ORDER — TIZANIDINE 2 MG/1
2 TABLET ORAL
Qty: 15 TABLET | Refills: 1 | Status: SHIPPED | OUTPATIENT
Start: 2022-06-07 | End: 2022-07-12

## 2022-07-12 RX ORDER — DICLOFENAC SODIUM 75 MG/1
75 TABLET, DELAYED RELEASE ORAL
Qty: 40 TABLET | Refills: 0 | Status: SHIPPED | OUTPATIENT
Start: 2022-07-12 | End: 2022-08-09

## 2022-07-12 RX ORDER — METHOCARBAMOL 500 MG/1
500 TABLET, FILM COATED ORAL
Qty: 20 TABLET | Refills: 0 | Status: SHIPPED | OUTPATIENT
Start: 2022-07-12

## 2022-07-18 RX ORDER — BUPROPION HYDROCHLORIDE 150 MG/1
150 TABLET ORAL DAILY
Qty: 90 TABLET | Refills: 0 | Status: SHIPPED | OUTPATIENT
Start: 2022-07-18 | End: 2022-09-13

## 2022-07-18 NOTE — TELEPHONE ENCOUNTER
Future Appointments:  Future Appointments   Date Time Provider Brenda Vázquezi   10/21/2022  8:30 AM Alia Epps MD [facility] BS AMB        Last Appointment With Me:  4/19/2022     Requested Prescriptions     Pending Prescriptions Disp Refills    buPROPion XL (WELLBUTRIN XL) 150 mg tablet 90 Tablet 0     Sig: Take 1 Tablet by mouth daily.

## 2022-08-09 RX ORDER — DICLOFENAC SODIUM 75 MG/1
TABLET, DELAYED RELEASE ORAL
Qty: 40 TABLET | Refills: 0 | Status: SHIPPED | OUTPATIENT
Start: 2022-08-09

## 2022-08-09 NOTE — TELEPHONE ENCOUNTER
----- Message from Darshan Lynn sent at 8/9/2022 11:06 AM EDT -----  Regarding: Meds  Morning! [facility] they are to send you the report! I have a made orthopedic appointment for September 29! I am trying to have my prescription for Diclofenac refill to get me through to the 29 of September or something for the pain!   Thank you

## 2022-08-09 NOTE — TELEPHONE ENCOUNTER
Future Appointments:  Future Appointments   Date Time Provider Brenda Calloway   10/21/2022  8:30 AM Kiersten Cortes MD [facility] BS AMB        Last Appointment With Me:  4/19/2022     Requested Prescriptions     Pending Prescriptions Disp Refills    diclofenac EC (VOLTAREN) 75 mg EC tablet [Pharmacy Med Name: Diclofenac Sodium 75 MG Oral Tablet Delayed Release] 40 Tablet 0     Sig: Take 1 tablet by mouth twice daily as needed for pain

## 2022-09-13 RX ORDER — ROSUVASTATIN CALCIUM 5 MG/1
TABLET, COATED ORAL
Qty: 90 TABLET | Refills: 1 | Status: SHIPPED | OUTPATIENT
Start: 2022-09-13 | End: 2022-10-21

## 2022-09-13 RX ORDER — BUPROPION HYDROCHLORIDE 150 MG/1
TABLET ORAL
Qty: 90 TABLET | Refills: 0 | Status: SHIPPED | OUTPATIENT
Start: 2022-09-13

## 2022-10-18 NOTE — PROGRESS NOTES
HISTORY OF PRESENT ILLNESS  Jacoby Santillan is a 81 y.o. female. HPI  Last here vv 4/19/22. Pt is here for routine care. This is an established visit completed with telemedicine was completed with video assist. The patient acknowledges and agrees to this method of visitation doxyme. Has a history of hypertension  BP at home yesterday 129/66  BP at other offices: 152/84 158/78  Pt takes half tab hctz 25 mg  She is also on Coreg 6.25 mg twice daily started by cardiology  Discussed taking full tablet due to recent elevated values     Wt today is 142 lbs, per pt, down 32 lbs since lov   Discussed diet and wt loss     Reviewed labs  Ordered labs  Pt will come into clinic for labs and BP check next week    She saw Dr. 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 Sayra Nguyen (derm) near [place]  Last visit was 3/21/22, had bx done on cheek, had basal cell carcinoma  Will f/U 11/11/22     Continues xalatan daily      Has a history of depression and anxiety  Continues wellbutrin now 150mg for depression and smoking cessation  Also taking zoloft 75mg  Doing well 10/22  Her  was recently dx with hodgkin's lymphoma, currently undergoing chemo    Taking crestor 10 mg  Started crestor 5 mg lov this was increased to 10 mg by cardiology     She saw Dr. Masood Oliveira (cardio) 9/28/22  Reviewed note:  Impression and Plan:  01. Coronary artery disease involving native coronary artery of native heart without angina pectoris (I25.10): CT noted  calcifications of LAD, LCx and RCA. She does not have anginal symptoms but her functional status is significantly limited  due to back pain. Recommend starting aspirin 81 mg daily. Will obtain stress test to assess for high risk disease.  Rick Wilkerson MPI to be done  02. Mixed hyperlipidemia (E78.2): Her lipids have improved since being started on rosuvastatin but not at goal. LDL goal  < 70 and TG < 150. Increase rosuvastatin to 10 mg daily. 03. Cardiac murmur (B12.6): Soft systolic murmur. Will obtain echo ECG done NSR without STT changes. 2-D w/CFD Echo  to be done  04. Hypertension (I10): Suboptimal control on medical therapy but notes being normal at other doctor's offices. Encouraged to monitor periodically at home. 05. Tobacco abuse (Z72.0): The patient was instructed on tobacco cessation. 06. Body mass index [BMI] 30.0-30.9, adult (Z68.30)  Started coreg 6.25 mg   Will f/U in 3/23   Echo was completed in the 90s   She had a stress test 10/19/22  Was told she has a small blockage (1/5) and a regurgitive valve      Previously advised pt to take otc vit D     Pt has a 40 pack year hx  Pt smokes slightly less than 1 PPD, not interested in cessation at this time   Wellbutrin to assist with cessation, however pt has been smoking the same amount   Counseled her on smoking cessation  Lov CT lung cancer screen 12/13/21, ordered     Pt lives with her   Grab bars in the shower     Pt is functionally independent   Does own laundry  Does own driving  Does own bills and meds       No memory concerns   Knows the month, date, year, location   Can recall 3/3 objects       ACP not on file.  SDM is her  and daughter in law Ragini Duggan  Provided information in the past.     PREVENTIVE:  Colonoscopy: 3/22/17, Dr. Reyna Zimmerman, repeat 5 years, due reminded  AAA: not needed, denies fmhx  Pap: Leeann Lee, 9/22  Mammogram: 9/20/21 [address], states she had this 9/22, will get report  DEXA: 10/17/19, osteopenia in hip, nl neck and spine, states she had this 9/22, will get report   Tdap: 2012, will only update if injured   Pneumovax: declines  Cyiffcd12: declines  Shingrix: declines  Flu shot: declines  A1c: 11/19 5.7 10/20 5.6 10/21 5.7 6/22 6.0  Eye exam: Dr. Crystal Schuster 7/22, Dr. Hunt Space cataract surgs on 17  Lipids:  LDL 91  CT lung cancer screen: 21 stable nodule  EK16, nsr   Hep C screen: , negative   Covid: 2 doses (moderna)    Patient Active Problem List    Diagnosis Date Noted    Essential hypertension 2018    Cardiac murmur 2017    Reactive depression 2016     Current Outpatient Medications   Medication Sig Dispense Refill    hydroCHLOROthiazide (HYDRODIURIL) 25 mg tablet TAKE 1/2 TABLET EVERY DAY 15 Tablet 0    rosuvastatin (CRESTOR) 5 mg tablet TAKE 1 TABLET EVERY NIGHT 90 Tablet 1    buPROPion XL (WELLBUTRIN XL) 150 mg tablet TAKE 1 TABLET EVERY DAY 90 Tablet 0    diclofenac EC (VOLTAREN) 75 mg EC tablet Take 1 tablet by mouth twice daily as needed for pain 40 Tablet 0    methocarbamoL (ROBAXIN) 500 mg tablet Take 1 Tablet by mouth three (3) times daily as needed for Muscle Spasm(s). 20 Tablet 0    sertraline (ZOLOFT) 50 mg tablet TAKE 1.5 TABLET EVERY  Tablet 1    ascorbic acid, vitamin C, (Vitamin C) 250 mg tablet Take  by mouth.      cyanocobalamin (Vitamin B-12) 1,000 mcg tablet Take 1,000 mcg by mouth daily. DM/PE/acetaminophen/doxylamine (VICKS DAYQUIL-NYQUIL COLD-FLU PO) NyQuil Liquicaps capsule   Take 1 capsule every day by oral route at bedtime.       acetaminophen (TYLENOL) 325 mg tablet Take  by mouth every four (4) hours as needed for Pain.      latanoprost (XALATAN) 0.005 % ophthalmic solution        Past Surgical History:   Procedure Laterality Date    HX GYN      Complete hysterectomy     HX HERNIA REPAIR        Lab Results   Component Value Date/Time    WBC 10.6 10/20/2021 11:23 AM    HGB 14.5 10/20/2021 11:23 AM    HCT 44.6 10/20/2021 11:23 AM    PLATELET 865  11:23 AM    MCV 97.8 10/20/2021 11:23 AM     Lab Results   Component Value Date/Time    Cholesterol, total 162 2022 07:53 AM    HDL Cholesterol 39 (L) 2022 07:53 AM    LDL, calculated 91 2022 07:53 AM    LDL, calculated 142 (H) 10/20/2021 11:23 AM    Triglyceride 186 (H) 06/02/2022 07:53 AM    CHOL/HDL Ratio 5.5 (H) 10/20/2021 11:23 AM     Lab Results   Component Value Date/Time    GFR est non-AA 55 (L) 10/20/2021 11:23 AM    GFR est AA >60 10/20/2021 11:23 AM    Creatinine 0.86 06/02/2022 07:53 AM    BUN 24 06/02/2022 07:53 AM    Sodium 144 06/02/2022 07:53 AM    Potassium 4.1 06/02/2022 07:53 AM    Chloride 106 06/02/2022 07:53 AM    CO2 25 06/02/2022 07:53 AM        Review of Systems   Respiratory:  Negative for shortness of breath. Cardiovascular:  Negative for chest pain. Physical Exam  Constitutional:       General: She is not in acute distress. Appearance: She is not ill-appearing, toxic-appearing or diaphoretic. HENT:      Head: Normocephalic and atraumatic. Right Ear: External ear normal.      Left Ear: External ear normal.   Eyes:      General:         Right eye: No discharge. Left eye: No discharge. Conjunctiva/sclera: Conjunctivae normal.      Pupils: Pupils are equal, round, and reactive to light. Cardiovascular:      Rate and Rhythm: Normal rate and regular rhythm. Heart sounds: No murmur heard. No friction rub. No gallop. Pulmonary:      Effort: No respiratory distress. Breath sounds: Normal breath sounds. No wheezing or rales. Chest:      Chest wall: No tenderness. Musculoskeletal:         General: Normal range of motion. Cervical back: Normal.   Skin:     General: Skin is warm and dry. Neurological:      Mental Status: She is oriented to person, place, and time. Mental status is at baseline. Coordination: Coordination normal.      Gait: Gait normal.   Psychiatric:         Mood and Affect: Mood normal.         Behavior: Behavior normal.     ASSESSMENT and PLAN    ICD-10-CM ICD-9-CM    1.  Essential hypertension  I10 401.9    Blood pressure has not been well controlled on hydrochlorothiazide we will have her come into the clinic for blood pressure check check metabolic panel for K and creatinine levels    Will increase to full tablet 25 mg daily    Continue Coreg 6.25 mg twice daily   2. Medicare annual wellness visit, subsequent  Z00.00 V70.0       3. Cardiac murmur  R01.1 785. 2    Echocardiogram completed with cardiology will get notes for review   4. Reactive depression  F32.9 300.4    Stable on Zoloft and Wellbutrin continue   5. IFG (impaired fasting glucose)  R73.01 790.21    Check A1c has been diet controlled   6. Coronary artery disease involving native coronary artery of native heart without angina pectoris  I25.10 414.01    Saw cardiology since last office visit they recommended starting aspirin daily and Coreg   7. Personal history of nicotine dependence  Z87.891 V15.82    CT lung cancer screen due and ordered   8 hyperlipidemia-improved on Crestor this was recently increased to 10 mg by cardiology check lipids adjust dosing as needed  Sciatica continue gabapentin and physical therapy  Depression screen reviewed and negative. Scribed by Delfina Younger, as dictated by Dr. Cecy Smith. Current diagnosis and concerns discussed with pt at length. Pt understands risks and benefits or current treatment plan and medications, and accepts the treatment and medication with any possible risks. Pt asks appropriate questions, which were answered. Pt was instructed to call with any concerns or problems. I have reviewed the note documented by the scribe. The services provided are my own. The documentation is accurate. Grant Barroso, who was evaluated through a synchronous (real-time) audio-video encounter, and/or her healthcare decision maker, is aware that it is a billable service, which includes applicable co-pays, with coverage as determined by her insurance carrier. She provided verbal consent to proceed and patient identification was verified.  This visit was conducted pursuant to the emergency declaration under the [facility], 0355 waiver authority and the [employer] and [employer] Act. A caregiver was present when appropriate. Ability to conduct physical exam was limited. The patient was located at: Home: [address] [place] [address]  The provider was located at:  [facility] [place] [id number]    --Shira Rodriguez on 10/18/2022 at 7:59 AM

## 2022-10-20 NOTE — PROGRESS NOTES
This is the Subsequent Medicare Annual Wellness Exam, performed 12 months or more after the Initial AWV or the last Subsequent AWV    I have reviewed the patient's medical history in detail and updated the computerized patient record. Assessment/Plan   Education and counseling provided:  Are appropriate based on today's review and evaluation    1. Medicare annual wellness visit, subsequent     Depression Risk Factor Screening     3 most recent PHQ Screens 10/21/2022   Little interest or pleasure in doing things Not at all   Feeling down, depressed, irritable, or hopeless Not at all   Total Score PHQ 2 0       Alcohol & Drug Abuse Risk Screen    Do you average more than 1 drink per night or more than 7 drinks a week:  No    On any one occasion in the past three months have you have had more than 3 drinks containing alcohol:  No          Functional Ability and Level of Safety    Hearing: Hearing is good. Activities of Daily Living: The home contains: handrails and grab bars  Patient does total self care      Ambulation: with no difficulty     Fall Risk:  Fall Risk Assessment, last 12 mths 10/21/2022   Able to walk? Yes   Fall in past 12 months?  0      Abuse Screen:  Patient is not abused   Safe lives w     Cognitive Screening    Has your family/caregiver stated any concerns about your memory: no     Cognitive Screening: Normal - Mini Cog Test    No memory concerns   Pt knows the month, day and year   Can recall 3/3 objects      Health Maintenance Due     Health Maintenance Due   Topic Date Due    Shingrix Vaccine Age 49> (1 of 2) Never done    DTaP/Tdap/Td series (3 - Td or Tdap) 01/02/2022    Colorectal Cancer Screening Combo  03/22/2022    COVID-19 Vaccine (3 - Booster for Julianna Bran series) 05/29/2022    Flu Vaccine (1) Never done    Medicare Yearly Exam  10/21/2022       Patient Care Team   Patient Care Team:  Shazia Garcia MD as PCP - General (Internal Medicine Physician)  Shazia Garcia, MD as PCP - [facility] Provider  Hitesh Gibbs MD (Otolaryngology)  Marya Rodriguez MD (Colon and Rectal Surgery)  justina (Ophthalmology)  Tres Heath MD (Ophthalmology)  Mariah Sidhu NP (Obstetrics & Gynecology)  Charly Cano DO (Dermatology Physician)    History     Patient Active Problem List   Diagnosis Code    Reactive depression F32.9    Cardiac murmur R01.1    Essential hypertension I10     No past medical history on file. Past Surgical History:   Procedure Laterality Date    HX GYN      Complete hysterectomy 1993    HX HERNIA REPAIR  1982     Current Outpatient Medications   Medication Sig Dispense Refill    sertraline (ZOLOFT) 50 mg tablet TAKE 1.5 TABLET EVERY  Tablet 0    hydroCHLOROthiazide (HYDRODIURIL) 25 mg tablet TAKE 1/2 TABLET EVERY DAY 15 Tablet 0    rosuvastatin (CRESTOR) 5 mg tablet TAKE 1 TABLET EVERY NIGHT 90 Tablet 1    buPROPion XL (WELLBUTRIN XL) 150 mg tablet TAKE 1 TABLET EVERY DAY 90 Tablet 0    diclofenac EC (VOLTAREN) 75 mg EC tablet Take 1 tablet by mouth twice daily as needed for pain 40 Tablet 0    methocarbamoL (ROBAXIN) 500 mg tablet Take 1 Tablet by mouth three (3) times daily as needed for Muscle Spasm(s). 20 Tablet 0    ascorbic acid, vitamin C, (Vitamin C) 250 mg tablet Take  by mouth.      cyanocobalamin (Vitamin B-12) 1,000 mcg tablet Take 1,000 mcg by mouth daily. DM/PE/acetaminophen/doxylamine (VICKS DAYQUIL-NYQUIL COLD-FLU PO) NyQuil Liquicaps capsule   Take 1 capsule every day by oral route at bedtime.       acetaminophen (TYLENOL) 325 mg tablet Take  by mouth every four (4) hours as needed for Pain.      latanoprost (XALATAN) 0.005 % ophthalmic solution        Allergies   Allergen Reactions    Erythromycin Other (comments)     Stomach cramps    Morphine Itching       Family History   Problem Relation Age of Onset    Hypertension Mother     Diabetes Father     Diabetes Sister     Diabetes Brother     Hypertension Brother Social History     Tobacco Use    Smoking status: Every Day     Packs/day: 1.50     Types: Cigarettes    Smokeless tobacco: Never   Substance Use Topics    Alcohol use: No     ACP not on file. SDM is her  and daughter in law Lidia Wang  Provided information in the past.       Colonoscopy: 3/22/17, Dr. Dheeraj Brower, repeat 5 years, due discussed   AAA: not needed, denies fmhx  Pap: Leeannteofilo Lee,  q2 yrs  Mammogram:  [address] annual  DEXA: 22 osteopenia in hip, nl neck and spine,q2yrs    Tdap: --due, declines   Pneumovax: declines  Jyhjoob24: declines  Shingrix: declines  Flu shot: declines        Eye exam: Dr. Cooney Locus , annual     A1c:    6.0 due now  Lipids:  LDL 91 annual     CT lung cancer screen: 21 stable nodule annual     EK16, nsr   Hep C screen: , negative   Covid: 2 doses (moderna) declines booster    Medication reconciliation completed by MA and reviewed by me. Medical/surgical/social/family history reviewed and updated by me. Patient provided AVS and preventative screening table. Patient verbalized understanding of all information discussed.      Ashish Tai MD

## 2022-10-20 NOTE — PATIENT INSTRUCTIONS
Medicare Wellness Visit, Female     The best way to live healthy is to have a lifestyle where you eat a well-balanced diet, exercise regularly, limit alcohol use, and quit all forms of tobacco/nicotine, if applicable. Regular preventive services are another way to keep healthy. Preventive services (vaccines, screening tests, monitoring & exams) can help personalize your care plan, which helps you manage your own care. Screening tests can find health problems at the earliest stages, when they are easiest to treat. [facility] follows the current, evidence-based guidelines published by the [facility] Ajay Cardozo ([facility]) when recommending preventive services for our patients. Because we follow these guidelines, sometimes recommendations change over time as research supports it. (For example, mammograms used to be recommended annually. Even though Medicare will still pay for an annual mammogram, the newer guidelines recommend a mammogram every two years for women of average risk). Of course, you and your doctor may decide to screen more often for some diseases, based on your risk and your co-morbidities (chronic disease you are already diagnosed with). Preventive services for you include:  - Medicare offers their members a free annual wellness visit, which is time for you and your primary care provider to discuss and plan for your preventive service needs. Take advantage of this benefit every year!  -All adults over the age of 72 should receive the recommended pneumonia vaccines. Current USPSTF guidelines recommend a series of two vaccines for the best pneumonia protection.   -All adults should have a flu vaccine yearly and a tetanus vaccine every 10 years.   -All adults age 48 and older should receive the shingles vaccines (series of two vaccines).       -All adults age 38-68 who are overweight should have a diabetes screening test once every three years.   -All adults born between [birth year] and 1965 should be screened once for Hepatitis C.  -Other screening tests and preventive services for persons with diabetes include: an eye exam to screen for diabetic retinopathy, a kidney function test, a foot exam, and stricter control over your cholesterol.   -Cardiovascular screening for adults with routine risk involves an electrocardiogram (ECG) at intervals determined by your doctor.   -Colorectal cancer screenings should be done for adults age 54-65 with no increased risk factors for colorectal cancer. There are a number of acceptable methods of screening for this type of cancer. Each test has its own benefits and drawbacks. Discuss with your doctor what is most appropriate for you during your annual wellness visit. The different tests include: colonoscopy (considered the best screening method), a fecal occult blood test, a fecal DNA test, and sigmoidoscopy.    -A bone mass density test is recommended when a woman turns 65 to screen for osteoporosis. This test is only recommended one time, as a screening. Some providers will use this same test as a disease monitoring tool if you already have osteoporosis. -Breast cancer screenings are recommended every other year for women of normal risk, age 54-69.  -Cervical cancer screenings for women over age 72 are only recommended with certain risk factors.      Here is a list of your current Health Maintenance items (your personalized list of preventive services) with a due date:  Health Maintenance Due   Topic Date Due    Shingles Vaccine (1 of 2) Never done    DTaP/Tdap/Td  (3 - Td or Tdap) 01/02/2022    Colorectal Screening  03/22/2022    COVID-19 Vaccine (3 - Booster for Jennifer Most series) 05/29/2022    Yearly Flu Vaccine (1) Never done    Annual Well Visit  10/21/2022

## 2022-10-21 ENCOUNTER — VIRTUAL VISIT (OUTPATIENT)
Dept: INTERNAL MEDICINE CLINIC | Age: 72
End: 2022-10-21
Payer: MEDICARE

## 2022-10-21 DIAGNOSIS — Z12.11 COLON CANCER SCREENING: ICD-10-CM

## 2022-10-21 DIAGNOSIS — R01.1 CARDIAC MURMUR: ICD-10-CM

## 2022-10-21 DIAGNOSIS — I10 ESSENTIAL HYPERTENSION: Primary | ICD-10-CM

## 2022-10-21 DIAGNOSIS — Z87.891 PERSONAL HISTORY OF NICOTINE DEPENDENCE: ICD-10-CM

## 2022-10-21 DIAGNOSIS — I25.10 CORONARY ARTERY DISEASE INVOLVING NATIVE CORONARY ARTERY OF NATIVE HEART WITHOUT ANGINA PECTORIS: ICD-10-CM

## 2022-10-21 DIAGNOSIS — R73.01 IFG (IMPAIRED FASTING GLUCOSE): ICD-10-CM

## 2022-10-21 DIAGNOSIS — Z00.00 MEDICARE ANNUAL WELLNESS VISIT, SUBSEQUENT: ICD-10-CM

## 2022-10-21 DIAGNOSIS — F32.9 REACTIVE DEPRESSION: ICD-10-CM

## 2022-10-21 PROCEDURE — 3017F COLORECTAL CA SCREEN DOC REV: CPT | Performed by: INTERNAL MEDICINE

## 2022-10-21 PROCEDURE — G8536 NO DOC ELDER MAL SCRN: HCPCS | Performed by: INTERNAL MEDICINE

## 2022-10-21 PROCEDURE — G8427 DOCREV CUR MEDS BY ELIG CLIN: HCPCS | Performed by: INTERNAL MEDICINE

## 2022-10-21 PROCEDURE — G9899 SCRN MAM PERF RSLTS DOC: HCPCS | Performed by: INTERNAL MEDICINE

## 2022-10-21 PROCEDURE — 1101F PT FALLS ASSESS-DOCD LE1/YR: CPT | Performed by: INTERNAL MEDICINE

## 2022-10-21 PROCEDURE — G8421 BMI NOT CALCULATED: HCPCS | Performed by: INTERNAL MEDICINE

## 2022-10-21 PROCEDURE — G0439 PPPS, SUBSEQ VISIT: HCPCS | Performed by: INTERNAL MEDICINE

## 2022-10-21 PROCEDURE — 1090F PRES/ABSN URINE INCON ASSESS: CPT | Performed by: INTERNAL MEDICINE

## 2022-10-21 PROCEDURE — G9717 DOC PT DX DEP/BP F/U NT REQ: HCPCS | Performed by: INTERNAL MEDICINE

## 2022-10-21 PROCEDURE — 99214 OFFICE O/P EST MOD 30 MIN: CPT | Performed by: INTERNAL MEDICINE

## 2022-10-21 PROCEDURE — G8756 NO BP MEASURE DOC: HCPCS | Performed by: INTERNAL MEDICINE

## 2022-10-21 PROCEDURE — G8399 PT W/DXA RESULTS DOCUMENT: HCPCS | Performed by: INTERNAL MEDICINE

## 2022-10-21 RX ORDER — GABAPENTIN 300 MG/1
1 CAPSULE ORAL
COMMUNITY

## 2022-10-21 RX ORDER — ROSUVASTATIN CALCIUM 10 MG/1
10 TABLET, COATED ORAL
Qty: 90 TABLET | Refills: 0 | Status: SHIPPED | OUTPATIENT
Start: 2022-10-21

## 2022-10-21 RX ORDER — CARVEDILOL 6.25 MG/1
1 TABLET ORAL 2 TIMES DAILY WITH MEALS
COMMUNITY
Start: 2022-10-19

## 2022-10-31 ENCOUNTER — CLINICAL SUPPORT (OUTPATIENT)
Dept: INTERNAL MEDICINE CLINIC | Age: 72
End: 2022-10-31

## 2022-10-31 ENCOUNTER — APPOINTMENT (OUTPATIENT)
Dept: INTERNAL MEDICINE CLINIC | Age: 72
End: 2022-10-31

## 2022-10-31 VITALS — DIASTOLIC BLOOD PRESSURE: 76 MMHG | HEART RATE: 76 BPM | SYSTOLIC BLOOD PRESSURE: 117 MMHG

## 2022-10-31 DIAGNOSIS — R73.01 IFG (IMPAIRED FASTING GLUCOSE): ICD-10-CM

## 2022-10-31 DIAGNOSIS — I10 ESSENTIAL HYPERTENSION: ICD-10-CM

## 2022-10-31 DIAGNOSIS — F32.9 REACTIVE DEPRESSION: ICD-10-CM

## 2022-10-31 DIAGNOSIS — Z01.30 BP CHECK: Primary | ICD-10-CM

## 2022-10-31 DIAGNOSIS — Z87.891 PERSONAL HISTORY OF NICOTINE DEPENDENCE: ICD-10-CM

## 2022-10-31 DIAGNOSIS — I25.10 CORONARY ARTERY DISEASE INVOLVING NATIVE CORONARY ARTERY OF NATIVE HEART WITHOUT ANGINA PECTORIS: ICD-10-CM

## 2022-10-31 DIAGNOSIS — Z00.00 MEDICARE ANNUAL WELLNESS VISIT, SUBSEQUENT: ICD-10-CM

## 2022-10-31 DIAGNOSIS — R01.1 CARDIAC MURMUR: ICD-10-CM

## 2022-10-31 LAB
ALBUMIN SERPL-MCNC: 3.8 G/DL (ref 3.5–5)
ALBUMIN/GLOB SERPL: 1.1 {RATIO} (ref 1.1–2.2)
ALP SERPL-CCNC: 100 U/L (ref 45–117)
ALT SERPL-CCNC: 20 U/L (ref 12–78)
ANION GAP SERPL CALC-SCNC: 6 MMOL/L (ref 5–15)
AST SERPL-CCNC: 16 U/L (ref 15–37)
BILIRUB SERPL-MCNC: 0.3 MG/DL (ref 0.2–1)
BUN SERPL-MCNC: 31 MG/DL (ref 6–20)
BUN/CREAT SERPL: 27 (ref 12–20)
CALCIUM SERPL-MCNC: 9.1 MG/DL (ref 8.5–10.1)
CHLORIDE SERPL-SCNC: 111 MMOL/L (ref 97–108)
CHOLEST SERPL-MCNC: 142 MG/DL
CO2 SERPL-SCNC: 27 MMOL/L (ref 21–32)
CREAT SERPL-MCNC: 1.14 MG/DL (ref 0.55–1.02)
ERYTHROCYTE [DISTWIDTH] IN BLOOD BY AUTOMATED COUNT: 12.7 % (ref 11.5–14.5)
EST. AVERAGE GLUCOSE BLD GHB EST-MCNC: 120 MG/DL
GLOBULIN SER CALC-MCNC: 3.4 G/DL (ref 2–4)
GLUCOSE SERPL-MCNC: 112 MG/DL (ref 65–100)
HBA1C MFR BLD: 5.8 % (ref 4–5.6)
HCT VFR BLD AUTO: 41.2 % (ref 35–47)
HDLC SERPL-MCNC: 37 MG/DL
HDLC SERPL: 3.8 {RATIO} (ref 0–5)
HGB BLD-MCNC: 13.4 G/DL (ref 11.5–16)
LDLC SERPL CALC-MCNC: 72.4 MG/DL (ref 0–100)
MCH RBC QN AUTO: 32.4 PG (ref 26–34)
MCHC RBC AUTO-ENTMCNC: 32.5 G/DL (ref 30–36.5)
MCV RBC AUTO: 99.5 FL (ref 80–99)
NRBC # BLD: 0 K/UL (ref 0–0.01)
NRBC BLD-RTO: 0 PER 100 WBC
PLATELET # BLD AUTO: 331 K/UL (ref 150–400)
PMV BLD AUTO: 10.5 FL (ref 8.9–12.9)
POTASSIUM SERPL-SCNC: 4 MMOL/L (ref 3.5–5.1)
PROT SERPL-MCNC: 7.2 G/DL (ref 6.4–8.2)
RBC # BLD AUTO: 4.14 M/UL (ref 3.8–5.2)
SODIUM SERPL-SCNC: 144 MMOL/L (ref 136–145)
TRIGL SERPL-MCNC: 163 MG/DL (ref ?–150)
TSH SERPL DL<=0.05 MIU/L-ACNC: 1.17 UIU/ML (ref 0.36–3.74)
VLDLC SERPL CALC-MCNC: 32.6 MG/DL
WBC # BLD AUTO: 12.4 K/UL (ref 3.6–11)

## 2022-10-31 NOTE — PROGRESS NOTES
Pt presents in clinic for BP check per Dr. Jerod Hayden. BP taken--see VS.  Visit Vitals  /76 (BP 1 Location: Left upper arm, BP Patient Position: Sitting)   Pulse 76       Pt informed Dr. Jerod Hayden will be notified. Pt informed if Dr. Jerod Hayden makes any adjustments, pt will be contacted. Pt verbalized understanding of information discussed w/ no further questions at this time.

## 2022-11-01 NOTE — PROGRESS NOTES
Please call patient back about results  Mild bump in wbc on labs--this is generally reactive from uri etc--will just repeat at f/U  Stable mild prediabetes--diet  Schedule 6 mo f/U

## 2022-11-02 NOTE — PROGRESS NOTES
Called, spoke to pt  Received two pt identifiers   Pt informed per Dr. Rollins Ember Mild bump in wbc on labs--this is generally reactive from uri  Advised pt will repeat at follow up  Pt informed per Dr. Rollins Ember Stable mild prediabetes-- work on diet   Pt offered and accepted virtual appt for 05/05/23 @ 818  Pt verbalizes understanding of the instructions and has no further questions at this time.

## 2022-11-18 RX ORDER — ROSUVASTATIN CALCIUM 10 MG/1
TABLET, COATED ORAL
Qty: 90 TABLET | Refills: 0 | Status: SHIPPED | OUTPATIENT
Start: 2022-11-18

## 2022-12-11 RX ORDER — HYDROCHLOROTHIAZIDE 25 MG/1
TABLET ORAL
Qty: 90 TABLET | Refills: 0 | Status: SHIPPED | OUTPATIENT
Start: 2022-12-11

## 2022-12-26 ENCOUNTER — PATIENT MESSAGE (OUTPATIENT)
Dept: INTERNAL MEDICINE CLINIC | Age: 72
End: 2022-12-26

## 2022-12-26 NOTE — LETTER
12/27/2022 8:59 AM    Ms. Elsy Calderon  [id number]   [address] [place] [address]     To whom it may concern,      Mrs. Jennifer Palumbo suffers from chronic back pain and is unable to sit longer than 20 minutes. Please excuse patient from [address] [place] [address]  Duty due to pain and inability to sit for extended periods of time. If there are any questions or concerns, please call my office.           Sincerely,      Freddy Jacobson MD

## 2022-12-27 NOTE — TELEPHONE ENCOUNTER
Nargis Nunez MD 12/27/2022 8:30 AM EST    Yes she can have the letter. ----- Message -----  From: Jacey Poseygris  Sent: 12/27/2022 8:11 AM EST  To: Peter Buitrago MD  Subject: FW: Letter for court     Okay to write letter for jury duty? If so, what should it say?  ----- Message -----  From: Elli Galindo  Sent: 12/26/2022 7:59 PM EST  To: [facility] Nurse Bradshaw  Subject: Letter for court     Please help me with a letter on your letterhead! Could you please write me a letter to the [employer] stating my diagnosis, I am in pain most of time and I know I cannot set in court from 9 to 5 I have to be up and about most of the day! The pain meds that I am on helps some but not that I can set all day, I also not sure I can walk from the parking lot to the second floor of the court house! Could you please state I am a 81years old and I am living with a lot of pain every day and night I dont even sleep all night from the pain! I have a appointment at the end of January which we will have to discuss new one/further treatment   Could you please do this quickly I am supposed to call in on January 6! Please help! Thank you!

## 2022-12-30 RX ORDER — SERTRALINE HYDROCHLORIDE 50 MG/1
TABLET, FILM COATED ORAL
Qty: 120 TABLET | Refills: 0 | Status: SHIPPED | OUTPATIENT
Start: 2022-12-30

## 2023-02-02 DIAGNOSIS — F32.9 REACTIVE DEPRESSION: Primary | ICD-10-CM

## 2023-02-02 RX ORDER — DIAZEPAM 5 MG/1
TABLET ORAL
Qty: 4 TABLET | Refills: 0 | Status: SHIPPED | OUTPATIENT
Start: 2023-02-02

## 2023-03-02 RX ORDER — ROSUVASTATIN CALCIUM 10 MG/1
10 TABLET, COATED ORAL
Qty: 90 TABLET | Refills: 0 | Status: SHIPPED | OUTPATIENT
Start: 2023-03-02

## 2023-03-02 NOTE — TELEPHONE ENCOUNTER
Future Appointments:  Future Appointments   Date Time Provider Brenda Vázquezi   5/5/2023  8:15 AM Codey Talbot MD [facility] BS AMB        Last Appointment With Me:  10/21/2022     Requested Prescriptions     Pending Prescriptions Disp Refills    rosuvastatin (CRESTOR) 10 mg tablet 90 Tablet 0     Sig: Take 1 Tablet by mouth nightly.

## 2023-05-05 ENCOUNTER — VIRTUAL VISIT (OUTPATIENT)
Dept: INTERNAL MEDICINE CLINIC | Age: 73
End: 2023-05-05

## 2023-05-05 DIAGNOSIS — E78.2 MIXED HYPERLIPIDEMIA: ICD-10-CM

## 2023-05-05 DIAGNOSIS — F17.200 SMOKER: ICD-10-CM

## 2023-05-05 DIAGNOSIS — Z12.11 COLON CANCER SCREENING: ICD-10-CM

## 2023-05-05 DIAGNOSIS — I10 ESSENTIAL HYPERTENSION: Primary | ICD-10-CM

## 2023-05-05 DIAGNOSIS — F33.41 RECURRENT MAJOR DEPRESSIVE DISORDER, IN PARTIAL REMISSION (HCC): ICD-10-CM

## 2023-05-05 DIAGNOSIS — R73.01 IFG (IMPAIRED FASTING GLUCOSE): ICD-10-CM

## 2023-05-05 DIAGNOSIS — I25.10 CORONARY ARTERY DISEASE INVOLVING NATIVE CORONARY ARTERY OF NATIVE HEART WITHOUT ANGINA PECTORIS: ICD-10-CM

## 2023-05-05 DIAGNOSIS — M48.061 SPINAL STENOSIS OF LUMBAR REGION WITHOUT NEUROGENIC CLAUDICATION: ICD-10-CM

## 2023-05-05 RX ORDER — HYDROCHLOROTHIAZIDE 25 MG/1
TABLET ORAL
Qty: 90 TABLET | Refills: 1 | Status: SHIPPED | OUTPATIENT
Start: 2023-05-05

## 2023-06-02 ENCOUNTER — TRANSCRIBE ORDERS (OUTPATIENT)
Facility: HOSPITAL | Age: 73
End: 2023-06-02

## 2023-06-02 DIAGNOSIS — Z87.891 PERSONAL HISTORY OF NICOTINE DEPENDENCE: Primary | ICD-10-CM

## 2023-06-09 NOTE — TELEPHONE ENCOUNTER
PCP: Airam Silva MD    Last appt: [unfilled]  Future Appointments   Date Time Provider Jc Saul   6/17/2023 10:00 AM SPT CT 1 SPTRCT Vera C   11/7/2023  8:30 AM Bi Ball MD [facility] BS AMB       Requested Prescriptions     Pending Prescriptions Disp Refills    buPROPion (WELLBUTRIN XL) 150 MG extended release tablet [Pharmacy Med Name: BUPROPION HYDROCHLORIDE ER (XL) 150 MG Tablet Extended Release 24 Hour] 90 tablet      Sig: TAKE 1 TABLET EVERY DAY

## 2023-06-10 RX ORDER — BUPROPION HYDROCHLORIDE 150 MG/1
TABLET ORAL
Qty: 90 TABLET | Refills: 0 | Status: SHIPPED | OUTPATIENT
Start: 2023-06-10

## 2023-06-25 RX ORDER — ROSUVASTATIN CALCIUM 10 MG/1
TABLET, COATED ORAL
Qty: 90 TABLET | Refills: 0 | Status: SHIPPED | OUTPATIENT
Start: 2023-06-25

## 2023-08-15 DIAGNOSIS — F32.9 REACTIVE DEPRESSION: Primary | ICD-10-CM

## 2023-08-15 RX ORDER — DIAZEPAM 5 MG/1
TABLET ORAL
Qty: 5 TABLET | Refills: 0 | Status: SHIPPED | OUTPATIENT
Start: 2023-08-15 | End: 2023-09-15

## 2023-08-15 NOTE — TELEPHONE ENCOUNTER
----- Message from Jack Mohr MD sent at 8/15/2023  1:08 PM EDT -----  Regarding: RE: Valium  Contact: [phone]  Pend refill of the Valium 5 tablets 0 refill  ----- Message -----  From: Gely Chaparro  Sent: 8/15/2023  12:36 PM EDT  To: Jack Mohr MD  Subject: FW: Valium                                         ----- Message -----  From: Kelvin Rebolledo"  Sent: 8/15/2023  11:59 AM EDT  To: , #  Subject: Valium                                           I am going to have another MRI could I get a prescription for Valium? At [facility] on [address] [address]!  Thank you

## 2023-08-15 NOTE — TELEPHONE ENCOUNTER
PCP: Patricia Barrera MD    Last appt:   5/5/2023    Future Appointments   Date Time Provider 4600  46[facility]   11/7/2023  8:30 AM Mattie Mathias MD [facility] AMB       Requested Prescriptions     Pending Prescriptions Disp Refills    diazePAM (VALIUM) 5 MG tablet 5 tablet 0     Sig: Take 1 tablet 30 min prior to procedure

## 2023-09-16 RX ORDER — BUPROPION HYDROCHLORIDE 150 MG/1
TABLET ORAL
Qty: 90 TABLET | Refills: 0 | Status: SHIPPED | OUTPATIENT
Start: 2023-09-16

## 2023-10-30 RX ORDER — HYDROCHLOROTHIAZIDE 25 MG/1
25 TABLET ORAL DAILY
Qty: 90 TABLET | Refills: 0 | Status: SHIPPED | OUTPATIENT
Start: 2023-10-30

## 2023-10-31 ASSESSMENT — ENCOUNTER SYMPTOMS: SHORTNESS OF BREATH: 0

## 2023-10-31 NOTE — PROGRESS NOTES
HISTORY OF PRESENT ILLNESS  Marine Hilton is a 72 y.o. female. HPI    This is an established visit completed with telemedicine was completed with video assist. The patient acknowledges and agrees to this method of visitation. Last here vv 5/5/23. Pt is here for routine care.      She c/o increasing back and hip pain which is causing her difficulty with walking and completing her normal tasks  States she has recently been walking with a cane because of this   Will increase gabapentin to 300mg 4 tabs daily  Gave info for pain clinics see below has been following with her neurosurgeon but symptoms have not been improving since surgery    She will be seeing rheumatology 11/23 dr Celeste Grant     Has a history of hypertension   BP at home 138/60   Pt takes hctz 25 mg full tablet (increased from half tablet)   She is also on Coreg 6.25 mg twice daily started by cardiology       Wt lvv 163 lbs, 172 lbs today per pt, up 9 lbs  Wt lov in 2021 174 lbs   Discussed diet and wt loss   States she has not been getting physical activity d/t hip and back pain    Reviewed labs   Ordered labs,   Reviewed cardiology labs     She went to [facility] 8/29/23 for congestion     She is following w/ Dr. 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 Teagan Williamson (derm) near [address]   Last visit was 11/22, had bx done on cheek, had basal cell carcinoma  Scheduled 11/23       Continues xalatan daily        Has a history of depression and anxiety   Continues wellbutrin now 150mg for depression and smoking cessation   Also taking zoloft

## 2023-11-02 SDOH — ECONOMIC STABILITY: FOOD INSECURITY: WITHIN THE PAST 12 MONTHS, YOU WORRIED THAT YOUR FOOD WOULD RUN OUT BEFORE YOU GOT MONEY TO BUY MORE.: NEVER TRUE

## 2023-11-02 SDOH — ECONOMIC STABILITY: HOUSING INSECURITY
IN THE LAST 12 MONTHS, WAS THERE A TIME WHEN YOU DID NOT HAVE A STEADY PLACE TO SLEEP OR SLEPT IN A SHELTER (INCLUDING NOW)?: NO

## 2023-11-02 SDOH — ECONOMIC STABILITY: INCOME INSECURITY: HOW HARD IS IT FOR YOU TO PAY FOR THE VERY BASICS LIKE FOOD, HOUSING, MEDICAL CARE, AND HEATING?: NOT HARD AT ALL

## 2023-11-02 SDOH — ECONOMIC STABILITY: FOOD INSECURITY: WITHIN THE PAST 12 MONTHS, THE FOOD YOU BOUGHT JUST DIDN'T LAST AND YOU DIDN'T HAVE MONEY TO GET MORE.: NEVER TRUE

## 2023-11-02 SDOH — ECONOMIC STABILITY: TRANSPORTATION INSECURITY
IN THE PAST 12 MONTHS, HAS LACK OF TRANSPORTATION KEPT YOU FROM MEETINGS, WORK, OR FROM GETTING THINGS NEEDED FOR DAILY LIVING?: NO

## 2023-11-02 NOTE — PROGRESS NOTES
Medicare Annual Wellness Visit    Jac Bray is here for Medicare AWV    Assessment & Plan   Essential hypertension  -     CBC; Future  -     Comprehensive Metabolic Panel; Future  -     Lipid Panel; Future  -     TSH; Future  -     Hemoglobin A1C; Future  Medicare annual wellness visit, subsequent  Reactive depression  Spinal stenosis of lumbar region without neurogenic claudication  Mixed hyperlipidemia  -     CBC; Future  -     Comprehensive Metabolic Panel; Future  -     Lipid Panel; Future  -     TSH; Future  -     Hemoglobin A1C; Future  Coronary artery disease involving native coronary artery of native heart without angina pectoris  Smoker  IFG (impaired fasting glucose)  -     CBC; Future  -     Comprehensive Metabolic Panel; Future  -     Lipid Panel; Future  -     TSH; Future  -     Hemoglobin A1C; Future  Neuropathy  -     CBC; Future  -     Comprehensive Metabolic Panel; Future  -     Lipid Panel; Future  -     TSH; Future  -     Hemoglobin A1C; Future  -     gabapentin (NEURONTIN) 300 MG capsule; Take 1 capsule by mouth 4 times daily for 180 days. Max Daily Amount: 1,200 mg, Disp-360 capsule, R-0Normal     Recommendations for Preventive Services Due: see orders and patient instructions/AVS.  Recommended screening schedule for the next 5-10 years is provided to the patient in written form: see Patient Instructions/AVS.     No follow-ups on file. Subjective       Patient's complete Health Risk Assessment and screening values have been reviewed and are found in Flowsheets. The following problems were reviewed today and where indicated follow up appointments were made and/or referrals ordered.     Positive Risk Factor Screenings with Interventions:                 Weight and Activity:  Physical Activity: Inactive (11/3/2023)    Exercise Vital Sign     Days of Exercise per Week: 0 days     Minutes of Exercise per Session: 0 min     On average, how many days per week do you engage in moderate to

## 2023-11-03 ENCOUNTER — TELEMEDICINE (OUTPATIENT)
Age: 73
End: 2023-11-03
Payer: MEDICARE

## 2023-11-03 DIAGNOSIS — Z00.00 MEDICARE ANNUAL WELLNESS VISIT, SUBSEQUENT: ICD-10-CM

## 2023-11-03 DIAGNOSIS — I25.10 CORONARY ARTERY DISEASE INVOLVING NATIVE CORONARY ARTERY OF NATIVE HEART WITHOUT ANGINA PECTORIS: ICD-10-CM

## 2023-11-03 DIAGNOSIS — G62.9 NEUROPATHY: ICD-10-CM

## 2023-11-03 DIAGNOSIS — I10 ESSENTIAL HYPERTENSION: Primary | ICD-10-CM

## 2023-11-03 DIAGNOSIS — R73.01 IFG (IMPAIRED FASTING GLUCOSE): ICD-10-CM

## 2023-11-03 DIAGNOSIS — F32.9 REACTIVE DEPRESSION: ICD-10-CM

## 2023-11-03 DIAGNOSIS — F17.200 SMOKER: ICD-10-CM

## 2023-11-03 DIAGNOSIS — E78.2 MIXED HYPERLIPIDEMIA: ICD-10-CM

## 2023-11-03 DIAGNOSIS — M48.061 SPINAL STENOSIS OF LUMBAR REGION WITHOUT NEUROGENIC CLAUDICATION: ICD-10-CM

## 2023-11-03 PROCEDURE — G8400 PT W/DXA NO RESULTS DOC: HCPCS | Performed by: INTERNAL MEDICINE

## 2023-11-03 PROCEDURE — 3017F COLORECTAL CA SCREEN DOC REV: CPT | Performed by: INTERNAL MEDICINE

## 2023-11-03 PROCEDURE — 1090F PRES/ABSN URINE INCON ASSESS: CPT | Performed by: INTERNAL MEDICINE

## 2023-11-03 PROCEDURE — 99214 OFFICE O/P EST MOD 30 MIN: CPT | Performed by: INTERNAL MEDICINE

## 2023-11-03 PROCEDURE — G0439 PPPS, SUBSEQ VISIT: HCPCS | Performed by: INTERNAL MEDICINE

## 2023-11-03 PROCEDURE — G8427 DOCREV CUR MEDS BY ELIG CLIN: HCPCS | Performed by: INTERNAL MEDICINE

## 2023-11-03 PROCEDURE — G8421 BMI NOT CALCULATED: HCPCS | Performed by: INTERNAL MEDICINE

## 2023-11-03 PROCEDURE — 4004F PT TOBACCO SCREEN RCVD TLK: CPT | Performed by: INTERNAL MEDICINE

## 2023-11-03 PROCEDURE — G8484 FLU IMMUNIZE NO ADMIN: HCPCS | Performed by: INTERNAL MEDICINE

## 2023-11-03 PROCEDURE — 1123F ACP DISCUSS/DSCN MKR DOCD: CPT | Performed by: INTERNAL MEDICINE

## 2023-11-03 RX ORDER — MIRABEGRON 25 MG/1
1 TABLET, FILM COATED, EXTENDED RELEASE ORAL DAILY
COMMUNITY
Start: 2023-10-31

## 2023-11-03 RX ORDER — GABAPENTIN 300 MG/1
300 CAPSULE ORAL 4 TIMES DAILY
Qty: 360 CAPSULE | Refills: 0 | Status: SHIPPED | OUTPATIENT
Start: 2023-11-03 | End: 2024-05-01

## 2023-11-03 ASSESSMENT — PATIENT HEALTH QUESTIONNAIRE - PHQ9
SUM OF ALL RESPONSES TO PHQ QUESTIONS 1-9: 0
SUM OF ALL RESPONSES TO PHQ QUESTIONS 1-9: 0
10. IF YOU CHECKED OFF ANY PROBLEMS, HOW DIFFICULT HAVE THESE PROBLEMS MADE IT FOR YOU TO DO YOUR WORK, TAKE CARE OF THINGS AT HOME, OR GET ALONG WITH OTHER PEOPLE: 0
1. LITTLE INTEREST OR PLEASURE IN DOING THINGS: 0
2. FEELING DOWN, DEPRESSED OR HOPELESS: 0
SUM OF ALL RESPONSES TO PHQ QUESTIONS 1-9: 0
SUM OF ALL RESPONSES TO PHQ9 QUESTIONS 1 & 2: 0
SUM OF ALL RESPONSES TO PHQ QUESTIONS 1-9: 0
1. LITTLE INTEREST OR PLEASURE IN DOING THINGS: 0

## 2023-11-03 ASSESSMENT — LIFESTYLE VARIABLES
HOW OFTEN DO YOU HAVE A DRINK CONTAINING ALCOHOL: NEVER
HOW MANY STANDARD DRINKS CONTAINING ALCOHOL DO YOU HAVE ON A TYPICAL DAY: PATIENT DOES NOT DRINK

## 2023-12-08 LAB
ALBUMIN SERPL-MCNC: 4.5 G/DL (ref 3.8–4.8)
ALBUMIN/GLOB SERPL: 2 {RATIO} (ref 1.2–2.2)
ALP SERPL-CCNC: 71 IU/L (ref 44–121)
ALT SERPL-CCNC: 11 IU/L (ref 0–32)
AST SERPL-CCNC: 13 IU/L (ref 0–40)
BILIRUB SERPL-MCNC: 0.3 MG/DL (ref 0–1.2)
BUN SERPL-MCNC: 24 MG/DL (ref 8–27)
BUN/CREAT SERPL: 30 (ref 12–28)
CALCIUM SERPL-MCNC: 9.8 MG/DL (ref 8.7–10.3)
CHLORIDE SERPL-SCNC: 106 MMOL/L (ref 96–106)
CHOLEST SERPL-MCNC: 161 MG/DL (ref 100–199)
CO2 SERPL-SCNC: 26 MMOL/L (ref 20–29)
CREAT SERPL-MCNC: 0.79 MG/DL (ref 0.57–1)
EGFRCR SERPLBLD CKD-EPI 2021: 80 ML/MIN/1.73
ERYTHROCYTE [DISTWIDTH] IN BLOOD BY AUTOMATED COUNT: 11.7 % (ref 11.7–15.4)
GLOBULIN SER CALC-MCNC: 2.2 G/DL (ref 1.5–4.5)
GLUCOSE SERPL-MCNC: 104 MG/DL (ref 70–99)
HBA1C MFR BLD: 6 % (ref 4.8–5.6)
HCT VFR BLD AUTO: 39.5 % (ref 34–46.6)
HDLC SERPL-MCNC: 39 MG/DL
HGB BLD-MCNC: 13.1 G/DL (ref 11.1–15.9)
LDLC SERPL CALC-MCNC: 87 MG/DL (ref 0–99)
MCH RBC QN AUTO: 32.8 PG (ref 26.6–33)
MCHC RBC AUTO-ENTMCNC: 33.2 G/DL (ref 31.5–35.7)
MCV RBC AUTO: 99 FL (ref 79–97)
PLATELET # BLD AUTO: 308 X10E3/UL (ref 150–450)
POTASSIUM SERPL-SCNC: 4.5 MMOL/L (ref 3.5–5.2)
PROT SERPL-MCNC: 6.7 G/DL (ref 6–8.5)
RBC # BLD AUTO: 4 X10E6/UL (ref 3.77–5.28)
SODIUM SERPL-SCNC: 144 MMOL/L (ref 134–144)
TRIGL SERPL-MCNC: 207 MG/DL (ref 0–149)
TSH SERPL DL<=0.005 MIU/L-ACNC: 1.2 UIU/ML (ref 0.45–4.5)
VLDLC SERPL CALC-MCNC: 35 MG/DL (ref 5–40)
WBC # BLD AUTO: 8.2 X10E3/UL (ref 3.4–10.8)

## 2023-12-08 ASSESSMENT — ENCOUNTER SYMPTOMS: SHORTNESS OF BREATH: 0

## 2023-12-08 NOTE — PROGRESS NOTES
HISTORY OF PRESENT ILLNESS  My Wheeler is a 72 y.o. female. HPI    Last here vv 11/3/23. Pt is here for routine care/pre-op. She presents with her sister Abimael Grayson. She will be having right hip replacement surgery with Dr Daiva Closs 12/21/23    Pt denies cp, sob, palpitations, orthopnea, claudication, PND, and new swelling in legs  Pt can sleep laying flat  Pt cannot walk up a set of stairs  Pt cannot walk around the mall   Pt can vacuum and do laundry     Functional mets <<4     EKG completed with cardio reviewed completed December 2023     Has a history of hypertension  BP today is 143/70 repeat 124/71  BP at cardio 136/82   BP at home not checked   Pt takes hctz 25 mg full tablet (increased from half tablet)   She is also on Coreg 6.25 mg twice daily started by cardiology      Wt today is 174 lbs, stable since lov    Discussed diet and wt loss     Reviewed labs   Ordered labs,     Lov She c/o increasing back and hip pain  Gave info for pain clinics see below has been following with her neurosurgeon    She saw Dr Ricardo Thornton (rheum) 11/27/23 and 12/1/23 for hip OA - no med changes  She referred her to Dr Keo Webb   F/u scheduled 1/20/24     She saw Dr Daiva Closs (ortho) 12/6/23 for hip pain  Reviewed note:  PLAN:  Impression and Plan: I have recommended total hip replacement. I have explained the risks, benefits and the treatment alternatives. The period of recovery and expected outcome was also discussed. The risks of total hip replacement were also discussed including, but not limited to, infection, DVT, PE, dislocation, neurovascular injury, blood loss, fracture and leg length discrepancy. Although 90-95% of total hip patients are happy their result, complete pain relief is cannot be guaranteed. Questions were answered and the preop video was shown. The patient will also attend the preop class and have a preop medical evaluation.     Treatment Plan:   Orders Placed This Encounter    X-RAY HIP RIGHT 2-3

## 2023-12-11 ENCOUNTER — OFFICE VISIT (OUTPATIENT)
Age: 73
End: 2023-12-11
Payer: MEDICARE

## 2023-12-11 VITALS
OXYGEN SATURATION: 99 % | HEART RATE: 62 BPM | RESPIRATION RATE: 18 BRPM | DIASTOLIC BLOOD PRESSURE: 71 MMHG | TEMPERATURE: 97.7 F | WEIGHT: 174 LBS | HEIGHT: 65 IN | SYSTOLIC BLOOD PRESSURE: 124 MMHG | BODY MASS INDEX: 28.99 KG/M2

## 2023-12-11 DIAGNOSIS — F32.9 REACTIVE DEPRESSION: ICD-10-CM

## 2023-12-11 DIAGNOSIS — Z01.810 PREOPERATIVE CARDIOVASCULAR EXAMINATION: Primary | ICD-10-CM

## 2023-12-11 DIAGNOSIS — R73.01 IFG (IMPAIRED FASTING GLUCOSE): ICD-10-CM

## 2023-12-11 DIAGNOSIS — E78.2 MIXED HYPERLIPIDEMIA: ICD-10-CM

## 2023-12-11 DIAGNOSIS — I25.10 CORONARY ARTERY DISEASE INVOLVING NATIVE CORONARY ARTERY OF NATIVE HEART WITHOUT ANGINA PECTORIS: ICD-10-CM

## 2023-12-11 DIAGNOSIS — I10 ESSENTIAL HYPERTENSION: ICD-10-CM

## 2023-12-11 PROCEDURE — 99214 OFFICE O/P EST MOD 30 MIN: CPT | Performed by: INTERNAL MEDICINE

## 2023-12-11 PROCEDURE — 3078F DIAST BP <80 MM HG: CPT | Performed by: INTERNAL MEDICINE

## 2023-12-11 PROCEDURE — G8419 CALC BMI OUT NRM PARAM NOF/U: HCPCS | Performed by: INTERNAL MEDICINE

## 2023-12-11 PROCEDURE — 3074F SYST BP LT 130 MM HG: CPT | Performed by: INTERNAL MEDICINE

## 2023-12-11 PROCEDURE — G8427 DOCREV CUR MEDS BY ELIG CLIN: HCPCS | Performed by: INTERNAL MEDICINE

## 2023-12-11 PROCEDURE — 1123F ACP DISCUSS/DSCN MKR DOCD: CPT | Performed by: INTERNAL MEDICINE

## 2023-12-11 PROCEDURE — 4004F PT TOBACCO SCREEN RCVD TLK: CPT | Performed by: INTERNAL MEDICINE

## 2023-12-11 PROCEDURE — 3017F COLORECTAL CA SCREEN DOC REV: CPT | Performed by: INTERNAL MEDICINE

## 2023-12-11 PROCEDURE — G8400 PT W/DXA NO RESULTS DOC: HCPCS | Performed by: INTERNAL MEDICINE

## 2023-12-11 PROCEDURE — 1090F PRES/ABSN URINE INCON ASSESS: CPT | Performed by: INTERNAL MEDICINE

## 2023-12-11 PROCEDURE — G8484 FLU IMMUNIZE NO ADMIN: HCPCS | Performed by: INTERNAL MEDICINE

## 2023-12-11 SDOH — ECONOMIC STABILITY: FOOD INSECURITY: WITHIN THE PAST 12 MONTHS, YOU WORRIED THAT YOUR FOOD WOULD RUN OUT BEFORE YOU GOT MONEY TO BUY MORE.: NEVER TRUE

## 2023-12-11 SDOH — ECONOMIC STABILITY: FOOD INSECURITY: WITHIN THE PAST 12 MONTHS, THE FOOD YOU BOUGHT JUST DIDN'T LAST AND YOU DIDN'T HAVE MONEY TO GET MORE.: NEVER TRUE

## 2023-12-11 SDOH — ECONOMIC STABILITY: INCOME INSECURITY: HOW HARD IS IT FOR YOU TO PAY FOR THE VERY BASICS LIKE FOOD, HOUSING, MEDICAL CARE, AND HEATING?: NOT HARD AT ALL

## 2023-12-11 ASSESSMENT — PATIENT HEALTH QUESTIONNAIRE - PHQ9
SUM OF ALL RESPONSES TO PHQ QUESTIONS 1-9: 0
5. POOR APPETITE OR OVEREATING: 0
7. TROUBLE CONCENTRATING ON THINGS, SUCH AS READING THE NEWSPAPER OR WATCHING TELEVISION: 0
8. MOVING OR SPEAKING SO SLOWLY THAT OTHER PEOPLE COULD HAVE NOTICED. OR THE OPPOSITE, BEING SO FIGETY OR RESTLESS THAT YOU HAVE BEEN MOVING AROUND A LOT MORE THAN USUAL: 0
SUM OF ALL RESPONSES TO PHQ QUESTIONS 1-9: 0
1. LITTLE INTEREST OR PLEASURE IN DOING THINGS: 0
SUM OF ALL RESPONSES TO PHQ QUESTIONS 1-9: 0
6. FEELING BAD ABOUT YOURSELF - OR THAT YOU ARE A FAILURE OR HAVE LET YOURSELF OR YOUR FAMILY DOWN: 0
10. IF YOU CHECKED OFF ANY PROBLEMS, HOW DIFFICULT HAVE THESE PROBLEMS MADE IT FOR YOU TO DO YOUR WORK, TAKE CARE OF THINGS AT HOME, OR GET ALONG WITH OTHER PEOPLE: 0
3. TROUBLE FALLING OR STAYING ASLEEP: 0
SUM OF ALL RESPONSES TO PHQ9 QUESTIONS 1 & 2: 0
SUM OF ALL RESPONSES TO PHQ QUESTIONS 1-9: 0
2. FEELING DOWN, DEPRESSED OR HOPELESS: 0
4. FEELING TIRED OR HAVING LITTLE ENERGY: 0
9. THOUGHTS THAT YOU WOULD BE BETTER OFF DEAD, OR OF HURTING YOURSELF: 0

## 2023-12-12 ENCOUNTER — TELEPHONE (OUTPATIENT)
Age: 73
End: 2023-12-12

## 2023-12-12 NOTE — TELEPHONE ENCOUNTER
Lucero Burleson with [facility] is asking if there is an office note to go with the H&P form? Please fax that to her. Fax #[phone] Attn: Lucero Burleson.

## 2023-12-13 RX ORDER — ROSUVASTATIN CALCIUM 10 MG/1
TABLET, COATED ORAL
Qty: 90 TABLET | Refills: 3 | Status: SHIPPED | OUTPATIENT
Start: 2023-12-13

## 2024-01-11 RX ORDER — HYDROCHLOROTHIAZIDE 25 MG/1
25 TABLET ORAL DAILY
Qty: 90 TABLET | Refills: 1 | Status: SHIPPED | OUTPATIENT
Start: 2024-01-11

## 2024-02-25 PROBLEM — F33.41 MAJOR DEPRESSIVE DISORDER, RECURRENT, IN PARTIAL REMISSION (HCC): Status: ACTIVE | Noted: 2024-02-25

## 2024-02-27 ENCOUNTER — OFFICE VISIT (OUTPATIENT)
Age: 74
End: 2024-02-27
Payer: MEDICARE

## 2024-02-27 VITALS
SYSTOLIC BLOOD PRESSURE: 102 MMHG | BODY MASS INDEX: 28.92 KG/M2 | WEIGHT: 173.6 LBS | RESPIRATION RATE: 18 BRPM | OXYGEN SATURATION: 94 % | TEMPERATURE: 98.3 F | DIASTOLIC BLOOD PRESSURE: 62 MMHG | HEART RATE: 55 BPM | HEIGHT: 65 IN

## 2024-02-27 DIAGNOSIS — R73.01 IFG (IMPAIRED FASTING GLUCOSE): ICD-10-CM

## 2024-02-27 DIAGNOSIS — I25.10 CORONARY ARTERY DISEASE INVOLVING NATIVE CORONARY ARTERY OF NATIVE HEART WITHOUT ANGINA PECTORIS: ICD-10-CM

## 2024-02-27 DIAGNOSIS — Z01.810 PREOP CARDIOVASCULAR EXAM: Primary | ICD-10-CM

## 2024-02-27 DIAGNOSIS — F33.41 MAJOR DEPRESSIVE DISORDER, RECURRENT, IN PARTIAL REMISSION (HCC): ICD-10-CM

## 2024-02-27 DIAGNOSIS — E78.2 MIXED HYPERLIPIDEMIA: ICD-10-CM

## 2024-02-27 DIAGNOSIS — I10 ESSENTIAL HYPERTENSION: ICD-10-CM

## 2024-02-27 PROCEDURE — 3078F DIAST BP <80 MM HG: CPT | Performed by: INTERNAL MEDICINE

## 2024-02-27 PROCEDURE — 3017F COLORECTAL CA SCREEN DOC REV: CPT | Performed by: INTERNAL MEDICINE

## 2024-02-27 PROCEDURE — 93005 ELECTROCARDIOGRAM TRACING: CPT | Performed by: INTERNAL MEDICINE

## 2024-02-27 PROCEDURE — G8400 PT W/DXA NO RESULTS DOC: HCPCS | Performed by: INTERNAL MEDICINE

## 2024-02-27 PROCEDURE — G8419 CALC BMI OUT NRM PARAM NOF/U: HCPCS | Performed by: INTERNAL MEDICINE

## 2024-02-27 PROCEDURE — 99214 OFFICE O/P EST MOD 30 MIN: CPT | Performed by: INTERNAL MEDICINE

## 2024-02-27 PROCEDURE — 3074F SYST BP LT 130 MM HG: CPT | Performed by: INTERNAL MEDICINE

## 2024-02-27 PROCEDURE — 93010 ELECTROCARDIOGRAM REPORT: CPT | Performed by: INTERNAL MEDICINE

## 2024-02-27 PROCEDURE — 1090F PRES/ABSN URINE INCON ASSESS: CPT | Performed by: INTERNAL MEDICINE

## 2024-02-27 PROCEDURE — G8427 DOCREV CUR MEDS BY ELIG CLIN: HCPCS | Performed by: INTERNAL MEDICINE

## 2024-02-27 PROCEDURE — 4004F PT TOBACCO SCREEN RCVD TLK: CPT | Performed by: INTERNAL MEDICINE

## 2024-02-27 PROCEDURE — 1123F ACP DISCUSS/DSCN MKR DOCD: CPT | Performed by: INTERNAL MEDICINE

## 2024-02-27 PROCEDURE — G8484 FLU IMMUNIZE NO ADMIN: HCPCS | Performed by: INTERNAL MEDICINE

## 2024-02-27 ASSESSMENT — PATIENT HEALTH QUESTIONNAIRE - PHQ9
2. FEELING DOWN, DEPRESSED OR HOPELESS: 0
9. THOUGHTS THAT YOU WOULD BE BETTER OFF DEAD, OR OF HURTING YOURSELF: 0
SUM OF ALL RESPONSES TO PHQ QUESTIONS 1-9: 0
SUM OF ALL RESPONSES TO PHQ9 QUESTIONS 1 & 2: 0
6. FEELING BAD ABOUT YOURSELF - OR THAT YOU ARE A FAILURE OR HAVE LET YOURSELF OR YOUR FAMILY DOWN: 0
4. FEELING TIRED OR HAVING LITTLE ENERGY: 0
SUM OF ALL RESPONSES TO PHQ QUESTIONS 1-9: 0
SUM OF ALL RESPONSES TO PHQ QUESTIONS 1-9: 0
5. POOR APPETITE OR OVEREATING: 0
7. TROUBLE CONCENTRATING ON THINGS, SUCH AS READING THE NEWSPAPER OR WATCHING TELEVISION: 0
1. LITTLE INTEREST OR PLEASURE IN DOING THINGS: 0
10. IF YOU CHECKED OFF ANY PROBLEMS, HOW DIFFICULT HAVE THESE PROBLEMS MADE IT FOR YOU TO DO YOUR WORK, TAKE CARE OF THINGS AT HOME, OR GET ALONG WITH OTHER PEOPLE: 0
SUM OF ALL RESPONSES TO PHQ QUESTIONS 1-9: 0
8. MOVING OR SPEAKING SO SLOWLY THAT OTHER PEOPLE COULD HAVE NOTICED. OR THE OPPOSITE, BEING SO FIGETY OR RESTLESS THAT YOU HAVE BEEN MOVING AROUND A LOT MORE THAN USUAL: 0
3. TROUBLE FALLING OR STAYING ASLEEP: 0

## 2024-02-27 NOTE — PROGRESS NOTES
HISTORY OF PRESENT ILLNESS  Yulissa May is a 73 y.o. female.  HPI    Last here 12/11/23. Pt is here for routine care/pre-op.  She presents with her sister Chiara.      Has a history of hypertension  BP today is    BP at home    Pt takes hctz 25 mg full tablet (increased from half tablet)   She is also on Coreg 6.25 mg twice daily started by cardiology      Wt today is lbs, since lov 174   Discussed diet and wt loss     Reviewed labs   Ordered labs     Previously she c/o increasing back and hip pain  Gave info for pain clinics see below has been following with her neurosurgeon     She saw Dr Seo (rheum) 11/27/23 and 12/1/23 for hip OA - no med changes  She referred her to Dr Chris      She saw Dr Washington Chris (ortho) 12/6/23 for hip pain  She will be having hip replacement surgery with Dr Washington Chris 12/21/23      She is following w/ Dr. Connell (neurosurg)  for back pain and hx of back surgery  Lov 10/23, he referred her to Dr Padilla (neuro) - appt scheduled 2/24   Had an L2-L5 surgery 4/20/23   Taking gabapentin 300 mg 3 tabs daily, discussed she can take 4 tabs  also on Robaxin and diclofenac   Had an MRI 9/29/23    She is completing PT       Pt follows with Dr. Franks (ENT) for trachea nodule   Last visit was 1/13/17   Instructed on care       Pt follows with Dr. Alcala (derm) near [address]   Last visit was 11/23       Continues xalatan daily        Has a history of depression and anxiety   Continues wellbutrin now 150mg for depression and smoking cessation   Also taking zoloft 75mg  Happy with medications 12/23       Taking crestor 10 mg       She is following w/ Dr. Franc Bishop (cardio)    Lov 12/7/23, reviewed note -  low intermediate risk for surgery, continue ASA prior to surgery, no stress test needed, EKG completed, no med changes  f/u q 6 months   Taking coreg 6.25 mg   Echo was completed in the 90s    She had a stress test 10/19/22   Was told she has a small blockage (1/5) and a regurgitive 
\"Have you been to the ER, urgent care clinic since your last visit?  Hospitalized since your last visit?\"    NO    “Have you seen or consulted any other health care providers outside of [facility] since your last visit?”    NO    “Have you had a colorectal cancer screening such as a colonoscopy/FIT/Cologuard?    NO         
Essential hypertension       Well-controlled on current regimen of hydrochlorothiazide and Coreg twice daily       3. Major depressive disorder, recurrent, in partial remission (HCC)         Stable on Zoloft and Wellbutrin continue       4. IFG (impaired fasting glucose)     Monitor A1c stable diet controlled   5. Mixed hyperlipidemia     Continue Crestor   6. Coronary artery disease involving native coronary artery of native heart without angina pectoris     Medically managed up-to-date with cardiology no active signs or symptoms of CAD    I have reviewed the note documented by the scribe.  The services provided are my own.  The documentation is accurate     Depression screen reviewed and negative.  Scribed by Edmar James of [facility], as dictated by Dr. Frieda Arevalo.    Current diagnosis and concerns discussed with pt at length. Pt understands risks and benefits or current treatment plan and medications, and accepts the treatment and medication with any possible risks. Pt asks appropriate questions, which were answered. Pt was instructed to call with any concerns or problems.    I have reviewed the note documented by the scribe. The services provided are my own. The documentation is accurate.

## 2024-05-08 RX ORDER — BUPROPION HYDROCHLORIDE 150 MG/1
TABLET ORAL
Qty: 90 TABLET | Refills: 3 | Status: SHIPPED | OUTPATIENT
Start: 2024-05-08

## 2024-06-04 NOTE — PROGRESS NOTES
HISTORY OF PRESENT ILLNESS  Yulissa May is a 74 y.o. female.  HPI    This is an established visit completed with telemedicine was completed with video assist. The patient acknowledges and agrees to this method of visitation.    Last here 2/27/24. she presents for routine care           Has a history of hypertension   BP at home is 132/62 generally running in the 130s over 60s or so was just at cardiology yesterday blood pressure was well-controlled   Pt takes hctz 25 mg full tablet    She is also on Coreg 6.25 mg twice daily    Discussed she is in pre diabetic range, and the A1c is climbed  Discussed diet weight loss avoiding high carbohydrate foods      Wt today is 169 lbs per pt , down 4 lbs since lov    Discussed diet and wt loss, low carbohydrate food, caloric beverages, portions  Reports increased appetite  Discussed GLP1 agonist and potential side effects.      Reviewed labs   Ordered labs        She saw Dr Seo (rheum) 11/27/23 and 12/1/23 for hip OA - no med changes     She saw Dr Washington Chris (ortho)  for  L hip pain previously had right hip replacement in December which went well  Lov 5/24  Reviewed note:  She comes in follow-up 11 weeks status post her most recent hip replacement she has had both done recently she is doing great she is very happy with her result no pain occasional back achiness. She is much more active than she was 6 months ago.     Normal gait no external support painless range of motion of the hips. She can get down to his shoes and socks.     Continue crease activities continue walking program recheck here for 1 year visit in about 9 months. Follow up at any time if the problems or concerns   She had L hip replacement surgery with Dr Washington Chris 3/24 doing well since surgery no complications      She is following w/ Dr. Connell (neurosurg)  for back pain and hx of back surgery  Lov 10/23, he referred her to Dr Padilla (neuro) - appt scheduled 2/24- states she cancelled th appt

## 2024-06-05 RX ORDER — HYDROCHLOROTHIAZIDE 25 MG/1
25 TABLET ORAL DAILY
Qty: 90 TABLET | Refills: 0 | Status: SHIPPED | OUTPATIENT
Start: 2024-06-05

## 2024-06-08 NOTE — PATIENT INSTRUCTIONS
4 low-dose aspirin. Wait for an ambulance. Do not try to drive yourself.  Watch closely for changes in your health, and be sure to contact your doctor if you have any problems.  Where can you learn more?  Go to https://www.gripNote.net/patientEd and enter F075 to learn more about \"A Healthy Heart: Care Instructions.\"  Current as of: June 24, 2023  Content Version: 14.1  © 6642-8177 [employer].   Care instructions adapted under license by [employer]. If you have questions about a medical condition or this instruction, always ask your healthcare professional. [employer] disclaims any warranty or liability for your use of this information.      Personalized Preventive Plan for Yulissa May - 6/14/2024  Medicare offers a range of preventive health benefits. Some of the tests and screenings are paid in full while other may be subject to a deductible, co-insurance, and/or copay.    Some of these benefits include a comprehensive review of your medical history including lifestyle, illnesses that may run in your family, and various assessments and screenings as appropriate.    After reviewing your medical record and screening and assessments performed today your provider may have ordered immunizations, labs, imaging, and/or referrals for you.  A list of these orders (if applicable) as well as your Preventive Care list are included within your After Visit Summary for your review.    Other Preventive Recommendations:    A preventive eye exam performed by an eye specialist is recommended every 1-2 years to screen for glaucoma; cataracts, macular degeneration, and other eye disorders.  A preventive dental visit is recommended every 6 months.  Try to get at least 150 minutes of exercise per week or 10,000 steps per day on a pedometer .  Order or download the FREE \"Exercise & Physical Activity: Your Everyday Guide\" from [employer]. Call 1-674.670.9242 or search The

## 2024-06-08 NOTE — PROGRESS NOTES
Medicare Annual Wellness Visit    Yulissa May is here for Medicare AWV    Assessment & Plan   Essential hypertension  IFG (impaired fasting glucose)  Major depressive disorder, recurrent, in partial remission (HCC)  Mixed hyperlipidemia  Coronary artery disease involving native coronary artery of native heart without angina pectoris  Medicare annual wellness visit, subsequent     Recommendations for Preventive Services Due: see orders and patient instructions/AVS.  Recommended screening schedule for the next 5-10 years is provided to the patient in written form: see Patient Instructions/AVS.     Return for Medicare Annual Wellness Visit in 1 year.     Subjective       Patient's complete Health Risk Assessment and screening values have been reviewed and are found in Flowsheets. The following problems were reviewed today and where indicated follow up appointments were made and/or referrals ordered.    Positive Risk Factor Screenings with Interventions:                Activity, Diet, and Weight:  On average, how many days per week do you engage in moderate to strenuous exercise (like a brisk walk)?: 0 days  On average, how many minutes do you engage in exercise at this level?: 0 min    Do you eat balanced/healthy meals regularly?: Yes    There is no height or weight on file to calculate BMI.      Inactivity Interventions:  Discussed increase walking             Advanced Directives:  Do you have a Living Will?: (!) No  Intervention:  has NO advanced directive - information provided        Tobacco Use:  Tobacco Use: High Risk (6/14/2024)    Patient History     Smoking Tobacco Use: Every Day     Smokeless Tobacco Use: Never     Passive Exposure: Not on file     E-cigarette/Vaping       Questions Responses    E-cigarette/Vaping Use     Start Date     Passive Exposure     Quit Date     Counseling Given     Comments         Interventions:  Counseled on cessation                   Objective      Patient-Reported

## 2024-06-11 ENCOUNTER — TELEPHONE (OUTPATIENT)
Age: 74
End: 2024-06-11

## 2024-06-11 DIAGNOSIS — E78.2 MIXED HYPERLIPIDEMIA: ICD-10-CM

## 2024-06-11 DIAGNOSIS — I10 ESSENTIAL HYPERTENSION: Primary | ICD-10-CM

## 2024-06-11 DIAGNOSIS — R73.01 IFG (IMPAIRED FASTING GLUCOSE): ICD-10-CM

## 2024-06-11 LAB — HBA1C MFR BLD: 6.2 % (ref 4.8–5.6)

## 2024-06-11 NOTE — TELEPHONE ENCOUNTER
Pt called     Is at [facility] on [address])    Needs lab orders sent there please (now)      Pt advised nurse is working on it and sending shortly

## 2024-06-12 LAB
ALBUMIN SERPL-MCNC: 4.5 G/DL (ref 3.8–4.8)
ALBUMIN/GLOB SERPL: 1.6 {RATIO}
ALP SERPL-CCNC: 101 IU/L (ref 44–121)
ALT SERPL-CCNC: 11 IU/L (ref 0–32)
AST SERPL-CCNC: 14 IU/L (ref 0–40)
BILIRUB SERPL-MCNC: 0.2 MG/DL (ref 0–1.2)
BUN SERPL-MCNC: 24 MG/DL (ref 8–27)
BUN/CREAT SERPL: 22 (ref 12–28)
CALCIUM SERPL-MCNC: 10.2 MG/DL (ref 8.7–10.3)
CHLORIDE SERPL-SCNC: 101 MMOL/L (ref 96–106)
CHOLEST SERPL-MCNC: 129 MG/DL (ref 100–199)
CO2 SERPL-SCNC: 28 MMOL/L (ref 20–29)
CREAT SERPL-MCNC: 1.09 MG/DL (ref 0.57–1)
EGFRCR SERPLBLD CKD-EPI 2021: 54 ML/MIN/1.73
GLOBULIN SER CALC-MCNC: 2.9 G/DL (ref 1.5–4.5)
GLUCOSE SERPL-MCNC: 124 MG/DL (ref 70–99)
HDLC SERPL-MCNC: 44 MG/DL
LDLC SERPL CALC-MCNC: 53 MG/DL (ref 0–99)
POTASSIUM SERPL-SCNC: 3.8 MMOL/L (ref 3.5–5.2)
PROT SERPL-MCNC: 7.4 G/DL (ref 6–8.5)
SODIUM SERPL-SCNC: 145 MMOL/L (ref 134–144)
TRIGL SERPL-MCNC: 194 MG/DL (ref 0–149)
TSH SERPL DL<=0.005 MIU/L-ACNC: 1.26 UIU/ML (ref 0.45–4.5)
VLDLC SERPL CALC-MCNC: 32 MG/DL (ref 5–40)

## 2024-06-14 ENCOUNTER — TELEMEDICINE (OUTPATIENT)
Age: 74
End: 2024-06-14

## 2024-06-14 DIAGNOSIS — F33.41 MAJOR DEPRESSIVE DISORDER, RECURRENT, IN PARTIAL REMISSION (HCC): ICD-10-CM

## 2024-06-14 DIAGNOSIS — Z00.00 MEDICARE ANNUAL WELLNESS VISIT, SUBSEQUENT: ICD-10-CM

## 2024-06-14 DIAGNOSIS — I10 ESSENTIAL HYPERTENSION: Primary | ICD-10-CM

## 2024-06-14 DIAGNOSIS — I25.10 CORONARY ARTERY DISEASE INVOLVING NATIVE CORONARY ARTERY OF NATIVE HEART WITHOUT ANGINA PECTORIS: ICD-10-CM

## 2024-06-14 DIAGNOSIS — R73.01 IFG (IMPAIRED FASTING GLUCOSE): ICD-10-CM

## 2024-06-14 DIAGNOSIS — E78.2 MIXED HYPERLIPIDEMIA: ICD-10-CM

## 2024-06-14 DIAGNOSIS — F17.200 SMOKER: ICD-10-CM

## 2024-06-14 RX ORDER — ROSUVASTATIN CALCIUM 10 MG/1
10 TABLET, COATED ORAL DAILY
COMMUNITY

## 2024-06-14 ASSESSMENT — PATIENT HEALTH QUESTIONNAIRE - PHQ9
SUM OF ALL RESPONSES TO PHQ QUESTIONS 1-9: 2
SUM OF ALL RESPONSES TO PHQ QUESTIONS 1-9: 2
1. LITTLE INTEREST OR PLEASURE IN DOING THINGS: SEVERAL DAYS
SUM OF ALL RESPONSES TO PHQ QUESTIONS 1-9: 2
SUM OF ALL RESPONSES TO PHQ9 QUESTIONS 1 & 2: 2
2. FEELING DOWN, DEPRESSED OR HOPELESS: SEVERAL DAYS
SUM OF ALL RESPONSES TO PHQ QUESTIONS 1-9: 2

## 2024-06-14 NOTE — PROGRESS NOTES
\"Have you been to the ER, urgent care clinic since your last visit?  Hospitalized since your last visit?\"    NO    “Have you seen or consulted any other health care providers outside of [facility] since your last visit?”    NO        “Have you had a colorectal cancer screening such as a colonoscopy/FIT/Cologuard?    NO    Date of last Colonoscopy: 3/22/2017  No cologuard on file  No FIT/FOBT on file   No flexible sigmoidoscopy on file         Click Here for Release of Records Request

## 2024-08-17 RX ORDER — HYDROCHLOROTHIAZIDE 25 MG/1
25 TABLET ORAL DAILY
Qty: 90 TABLET | Refills: 0 | Status: SHIPPED | OUTPATIENT
Start: 2024-08-17

## 2024-10-31 RX ORDER — HYDROCHLOROTHIAZIDE 25 MG/1
25 TABLET ORAL DAILY
Qty: 90 TABLET | Refills: 0 | Status: SHIPPED | OUTPATIENT
Start: 2024-10-31

## 2024-12-10 NOTE — PROGRESS NOTES
and carvedilol TSH    CBC      5. IFG (impaired fasting glucose)  Comprehensive Metabolic Panel    Hemoglobin A1C    Lipid Panel   Check A1c diet controlled TSH    CBC      6. Major depressive disorder, recurrent, in partial remission (HCC)     See above   7. Smoker     CT lung cancer screen ordered   8. Personal history of nicotine dependence  CT LUNG SCREENING (INITIAL/ANNUAL)   See above of note she is also overdue for colonoscopy she is very well aware and will schedule    I have reviewed the note documented by the scribe.  The services provided are my own.  The documentation is accurate     Depression screen reviewed and negative.  Scribed by Martha Munoz of [facility], as dictated by Dr. Frieda Arevalo.    Current diagnosis and concerns discussed with pt at length. Pt understands risks and benefits or current treatment plan and medications, and accepts the treatment and medication with any possible risks. Pt asks appropriate questions, which were answered. Pt was instructed to call with any concerns or problems.    I have reviewed the note documented by the scribe. The services provided are my own. The documentation is accurate.

## 2024-12-13 LAB
BUN SERPL-MCNC: 23 MG/DL (ref 8–27)
BUN/CREAT SERPL: 23 (ref 12–28)
CALCIUM SERPL-MCNC: 9.3 MG/DL (ref 8.7–10.3)
CHLORIDE SERPL-SCNC: 104 MMOL/L (ref 96–106)
CO2 SERPL-SCNC: 26 MMOL/L (ref 20–29)
CREAT SERPL-MCNC: 0.98 MG/DL (ref 0.57–1)
EGFRCR SERPLBLD CKD-EPI 2021: 61 ML/MIN/1.73
GLUCOSE SERPL-MCNC: 108 MG/DL (ref 70–99)
HBA1C MFR BLD: 6.3 % (ref 4.8–5.6)
POTASSIUM SERPL-SCNC: 3.6 MMOL/L (ref 3.5–5.2)
SODIUM SERPL-SCNC: 145 MMOL/L (ref 134–144)

## 2024-12-15 SDOH — ECONOMIC STABILITY: FOOD INSECURITY: WITHIN THE PAST 12 MONTHS, THE FOOD YOU BOUGHT JUST DIDN'T LAST AND YOU DIDN'T HAVE MONEY TO GET MORE.: NEVER TRUE

## 2024-12-15 SDOH — ECONOMIC STABILITY: FOOD INSECURITY: WITHIN THE PAST 12 MONTHS, YOU WORRIED THAT YOUR FOOD WOULD RUN OUT BEFORE YOU GOT MONEY TO BUY MORE.: NEVER TRUE

## 2024-12-15 SDOH — ECONOMIC STABILITY: INCOME INSECURITY: HOW HARD IS IT FOR YOU TO PAY FOR THE VERY BASICS LIKE FOOD, HOUSING, MEDICAL CARE, AND HEATING?: NOT HARD AT ALL

## 2024-12-17 ENCOUNTER — OFFICE VISIT (OUTPATIENT)
Age: 74
End: 2024-12-17
Payer: MEDICARE

## 2024-12-17 VITALS
BODY MASS INDEX: 28.24 KG/M2 | HEIGHT: 64 IN | DIASTOLIC BLOOD PRESSURE: 67 MMHG | SYSTOLIC BLOOD PRESSURE: 122 MMHG | TEMPERATURE: 97.7 F | OXYGEN SATURATION: 97 % | HEART RATE: 61 BPM | RESPIRATION RATE: 15 BRPM | WEIGHT: 165.4 LBS

## 2024-12-17 DIAGNOSIS — I25.10 CORONARY ARTERY DISEASE INVOLVING NATIVE CORONARY ARTERY OF NATIVE HEART WITHOUT ANGINA PECTORIS: ICD-10-CM

## 2024-12-17 DIAGNOSIS — E78.2 MIXED HYPERLIPIDEMIA: ICD-10-CM

## 2024-12-17 DIAGNOSIS — F17.200 SMOKER: ICD-10-CM

## 2024-12-17 DIAGNOSIS — F32.9 REACTIVE DEPRESSION: ICD-10-CM

## 2024-12-17 DIAGNOSIS — R73.01 IFG (IMPAIRED FASTING GLUCOSE): ICD-10-CM

## 2024-12-17 DIAGNOSIS — I10 ESSENTIAL HYPERTENSION: Primary | ICD-10-CM

## 2024-12-17 DIAGNOSIS — F33.41 MAJOR DEPRESSIVE DISORDER, RECURRENT, IN PARTIAL REMISSION (HCC): ICD-10-CM

## 2024-12-17 DIAGNOSIS — Z87.891 PERSONAL HISTORY OF NICOTINE DEPENDENCE: ICD-10-CM

## 2024-12-17 PROCEDURE — 99214 OFFICE O/P EST MOD 30 MIN: CPT | Performed by: INTERNAL MEDICINE

## 2024-12-17 RX ORDER — TRAZODONE HYDROCHLORIDE 50 MG/1
50 TABLET, FILM COATED ORAL NIGHTLY PRN
COMMUNITY
Start: 2024-12-04

## 2024-12-17 RX ORDER — ROSUVASTATIN CALCIUM 20 MG/1
20 TABLET, COATED ORAL DAILY
COMMUNITY
Start: 2024-12-06

## 2024-12-17 ASSESSMENT — PATIENT HEALTH QUESTIONNAIRE - PHQ9
SUM OF ALL RESPONSES TO PHQ QUESTIONS 1-9: 0
2. FEELING DOWN, DEPRESSED OR HOPELESS: NOT AT ALL
SUM OF ALL RESPONSES TO PHQ9 QUESTIONS 1 & 2: 0
SUM OF ALL RESPONSES TO PHQ QUESTIONS 1-9: 0
1. LITTLE INTEREST OR PLEASURE IN DOING THINGS: NOT AT ALL
SUM OF ALL RESPONSES TO PHQ QUESTIONS 1-9: 0
SUM OF ALL RESPONSES TO PHQ QUESTIONS 1-9: 0

## 2025-01-03 ENCOUNTER — PATIENT MESSAGE (OUTPATIENT)
Age: 75
End: 2025-01-03

## 2025-01-03 RX ORDER — OSELTAMIVIR PHOSPHATE 75 MG/1
75 CAPSULE ORAL 2 TIMES DAILY
Qty: 10 CAPSULE | Refills: 0 | Status: SHIPPED | OUTPATIENT
Start: 2025-01-03 | End: 2025-01-08

## 2025-01-13 RX ORDER — HYDROCHLOROTHIAZIDE 25 MG/1
25 TABLET ORAL DAILY
Qty: 90 TABLET | Refills: 1 | Status: SHIPPED | OUTPATIENT
Start: 2025-01-13

## 2025-05-30 RX ORDER — BUPROPION HYDROCHLORIDE 150 MG/1
150 TABLET ORAL DAILY
Qty: 90 TABLET | Refills: 0 | Status: SHIPPED | OUTPATIENT
Start: 2025-05-30

## 2025-06-09 RX ORDER — HYDROCHLOROTHIAZIDE 25 MG/1
25 TABLET ORAL DAILY
Qty: 90 TABLET | Refills: 0 | Status: SHIPPED | OUTPATIENT
Start: 2025-06-09

## 2025-07-02 NOTE — PROGRESS NOTES
HISTORY OF PRESENT ILLNESS  Yulissa May is a 75 y.o. female.  HPI    Last here 12/17/25. she presents for routine care      Has a history of hypertension  BP today is 126/65  BP at home: not checked  Pt takes hctz 25 mg full tablet   She is also on Coreg 6.25 mg twice daily, HR low, will repeat was 67 (if >50 no changes in coreg dose), Pt denies monitoring HR at home.      Discussed she is in pre diabetic range, and the A1c is climbed  Discussed diet weight loss avoiding high carbohydrate foods      Wt today is 179 lbs, 165 lbs lov  Reiterated diet and wt loss, low carbohydrate food, caloric beverages, portion control. Expressed that her labs indicate she is borderline diabetic, will reassess on next labs. Discussed GLP1 agonists if she were to be definitively diabetic in future. Discussed preventative metformin, will defer as can cloak diabetic dx.      Reviewed labs  Ordered labs    Pt saw  Dr Olivares urology.  January 2024 bladder scan performed, no medications indicated. F/u prn        She saw Dr Seo (rheum) 11/27/23 and 12/1/23 for hip OA - no med changes     She saw Dr Washington Chris (ortho)  for  L hip pain previously had right hip replacement in December which went well  Lov 5/24  She had L hip replacement surgery with Dr Washington Chris 3/24 doing well since surgery no complications      She is following w/ Dr. 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 Alcala (derm) near [address]   Last visit was 11/24       Continues xalatan daily           Has a history of depression and anxiety   Continues

## 2025-07-06 SDOH — ECONOMIC STABILITY: FOOD INSECURITY: WITHIN THE PAST 12 MONTHS, THE FOOD YOU BOUGHT JUST DIDN'T LAST AND YOU DIDN'T HAVE MONEY TO GET MORE.: NEVER TRUE

## 2025-07-06 SDOH — ECONOMIC STABILITY: FOOD INSECURITY: WITHIN THE PAST 12 MONTHS, YOU WORRIED THAT YOUR FOOD WOULD RUN OUT BEFORE YOU GOT MONEY TO BUY MORE.: NEVER TRUE

## 2025-07-06 SDOH — HEALTH STABILITY: PHYSICAL HEALTH: ON AVERAGE, HOW MANY MINUTES DO YOU ENGAGE IN EXERCISE AT THIS LEVEL?: 10 MIN

## 2025-07-06 SDOH — ECONOMIC STABILITY: INCOME INSECURITY: IN THE LAST 12 MONTHS, WAS THERE A TIME WHEN YOU WERE NOT ABLE TO PAY THE MORTGAGE OR RENT ON TIME?: NO

## 2025-07-06 SDOH — ECONOMIC STABILITY: TRANSPORTATION INSECURITY
IN THE PAST 12 MONTHS, HAS THE LACK OF TRANSPORTATION KEPT YOU FROM MEDICAL APPOINTMENTS OR FROM GETTING MEDICATIONS?: NO

## 2025-07-06 SDOH — HEALTH STABILITY: PHYSICAL HEALTH: ON AVERAGE, HOW MANY DAYS PER WEEK DO YOU ENGAGE IN MODERATE TO STRENUOUS EXERCISE (LIKE A BRISK WALK)?: 1 DAY

## 2025-07-06 ASSESSMENT — PATIENT HEALTH QUESTIONNAIRE - PHQ9
SUM OF ALL RESPONSES TO PHQ QUESTIONS 1-9: 2
2. FEELING DOWN, DEPRESSED OR HOPELESS: SEVERAL DAYS
SUM OF ALL RESPONSES TO PHQ QUESTIONS 1-9: 2
1. LITTLE INTEREST OR PLEASURE IN DOING THINGS: SEVERAL DAYS
SUM OF ALL RESPONSES TO PHQ QUESTIONS 1-9: 2
SUM OF ALL RESPONSES TO PHQ QUESTIONS 1-9: 2

## 2025-07-06 ASSESSMENT — LIFESTYLE VARIABLES
HOW OFTEN DO YOU HAVE SIX OR MORE DRINKS ON ONE OCCASION: 1
HOW MANY STANDARD DRINKS CONTAINING ALCOHOL DO YOU HAVE ON A TYPICAL DAY: PATIENT DOES NOT DRINK
HOW OFTEN DO YOU HAVE A DRINK CONTAINING ALCOHOL: 1
HOW OFTEN DO YOU HAVE A DRINK CONTAINING ALCOHOL: NEVER
HOW MANY STANDARD DRINKS CONTAINING ALCOHOL DO YOU HAVE ON A TYPICAL DAY: 0

## 2025-07-06 NOTE — PROGRESS NOTES
Medicare Annual Wellness Visit    Yulissa May is here for Medicare AWV    Assessment & Plan   Essential hypertension  -     Basic Metabolic Panel; Future  -     Hemoglobin A1C; Future  Medicare annual wellness visit, subsequent  Coronary artery disease involving native coronary artery of native heart without angina pectoris  IFG (impaired fasting glucose)  -     Basic Metabolic Panel; Future  -     Hemoglobin A1C; Future  Reactive depression  Mixed hyperlipidemia  Smoker  Major depressive disorder, recurrent, in partial remission  Osteopenia of multiple sites  -     DEXA BONE DENSITY AXIAL SKELETON; Future  Obesity (BMI 30.0-34.9)        Return in about 3 months (around 10/9/2025).     Subjective       Patient's complete Health Risk Assessment and screening values have been reviewed and are found in Flowsheets. The following problems were reviewed today and where indicated follow up appointments were made and/or referrals ordered.    Positive Risk Factor Screenings with Interventions:              Inactivity:  On average, how many days per week do you engage in moderate to strenuous exercise (like a brisk walk)?: 1 day (!) Abnormal  On average, how many minutes do you engage in exercise at this level?: 10 min  Interventions:  Increase walking      Abnormal BMI (obese):  Body mass index is 31.21 kg/m². (!) Abnormal  Interventions:  low carbohydrate diet              Advanced Directives:  Do you have a Living Will?: (!) No  Intervention:  has NO advanced directive - information provided        Tobacco Use:    Tobacco Use      Smoking status: Every Day        Packs/day: 0.50        Years: 0.9 packs/day for 77.8 years (73.9 ttl pk-yrs)        Types: Cigarettes        Start date: 9/19/1982      Smokeless tobacco: Never     Interventions:  discussed                   Objective   Vitals:    07/09/25 1106   BP: 128/65   BP Site: Left Upper Arm   Patient Position: Sitting   Pulse: 67   Temp: 97.2 °F (36.2 °C)

## 2025-07-06 NOTE — PATIENT INSTRUCTIONS
Learning About Being Active as an Older Adult  Why is being active important as you get older?     Being active is one of the best things you can do for your health. And it's never too late to start. Being active--or getting active, if you aren't already--has definite benefits. It can:  Give you more energy,  Keep your mind sharp.  Improve balance to reduce your risk of falls.  Help you manage chronic illness with fewer medicines.  No matter how old you are, how fit you are, or what health problems you have, there is a form of activity that will work for you. And the more physical activity you can do, the better your overall health will be.  What kinds of activity can help you stay healthy?  Being more active will make your daily activities easier. Physical activity includes planned exercise and things you do in daily life. There are four types of activity:  Aerobic.  Doing aerobic activity makes your heart and lungs strong.  Includes walking, dancing, and gardening.  Aim for at least 2½ hours spread throughout the week.  It improves your energy and can help you sleep better.  Muscle-strengthening.  This type of activity can help maintain muscle and strengthen bones.  Includes climbing stairs, using resistance bands, and lifting or carrying heavy loads.  Aim for at least twice a week.  It can help protect the knees and other joints.  Stretching.  Stretching gives you better range of motion in joints and muscles.  Includes upper arm stretches, calf stretches, and gentle yoga.  Aim for at least twice a week, preferably after your muscles are warmed up from other activities.  It can help you function better in daily life.  Balancing.  This helps you stay coordinated and have good posture.  Includes heel-to-toe walking, jennifer chi, and certain types of yoga.  Aim for at least 3 days a week.  It can reduce your risk of falling.  Even if you have a hard time meeting the recommendations, it's better to be more active

## 2025-07-07 LAB
BASOPHILS # BLD AUTO: 0.1 X10E3/UL (ref 0–0.2)
BASOPHILS NFR BLD AUTO: 1 %
EOSINOPHIL # BLD AUTO: 0.3 X10E3/UL (ref 0–0.4)
EOSINOPHIL NFR BLD AUTO: 3 %
ERYTHROCYTE [DISTWIDTH] IN BLOOD BY AUTOMATED COUNT: 12.4 % (ref 11.7–15.4)
HCT VFR BLD AUTO: 41.7 % (ref 34–46.6)
HGB BLD-MCNC: 13.2 G/DL (ref 11.1–15.9)
IMM GRANULOCYTES # BLD AUTO: 0 X10E3/UL (ref 0–0.1)
IMM GRANULOCYTES NFR BLD AUTO: 0 %
LYMPHOCYTES # BLD AUTO: 2.8 X10E3/UL (ref 0.7–3.1)
LYMPHOCYTES NFR BLD AUTO: 32 %
MCH RBC QN AUTO: 30.6 PG (ref 26.6–33)
MCHC RBC AUTO-ENTMCNC: 31.7 G/DL (ref 31.5–35.7)
MCV RBC AUTO: 97 FL (ref 79–97)
MONOCYTES # BLD AUTO: 0.6 X10E3/UL (ref 0.1–0.9)
MONOCYTES NFR BLD AUTO: 6 %
NEUTROPHILS # BLD AUTO: 5 X10E3/UL (ref 1.4–7)
NEUTROPHILS NFR BLD AUTO: 58 %
PLATELET # BLD AUTO: 293 X10E3/UL (ref 150–450)
RBC # BLD AUTO: 4.32 X10E6/UL (ref 3.77–5.28)
WBC # BLD AUTO: 8.7 X10E3/UL (ref 3.4–10.8)

## 2025-07-08 ENCOUNTER — RESULTS FOLLOW-UP (OUTPATIENT)
Age: 75
End: 2025-07-08

## 2025-07-08 LAB
ALBUMIN SERPL-MCNC: 4.4 G/DL (ref 3.8–4.8)
ALP SERPL-CCNC: 74 IU/L (ref 44–121)
ALT SERPL-CCNC: 14 IU/L (ref 0–32)
AST SERPL-CCNC: 19 IU/L (ref 0–40)
BILIRUB SERPL-MCNC: 0.3 MG/DL (ref 0–1.2)
BUN SERPL-MCNC: 28 MG/DL (ref 8–27)
BUN/CREAT SERPL: 24 (ref 12–28)
CALCIUM SERPL-MCNC: 9.6 MG/DL (ref 8.7–10.3)
CHLORIDE SERPL-SCNC: 102 MMOL/L (ref 96–106)
CHOLEST SERPL-MCNC: 142 MG/DL (ref 100–199)
CO2 SERPL-SCNC: 26 MMOL/L (ref 20–29)
CREAT SERPL-MCNC: 1.16 MG/DL (ref 0.57–1)
EGFRCR SERPLBLD CKD-EPI 2021: 50 ML/MIN/1.73
GLOBULIN SER CALC-MCNC: 2.9 G/DL (ref 1.5–4.5)
GLUCOSE SERPL-MCNC: 126 MG/DL (ref 70–99)
HBA1C MFR BLD: 6.4 % (ref 4.8–5.6)
HDLC SERPL-MCNC: 43 MG/DL
LDLC SERPL CALC-MCNC: 64 MG/DL (ref 0–99)
POTASSIUM SERPL-SCNC: 3.8 MMOL/L (ref 3.5–5.2)
PROT SERPL-MCNC: 7.3 G/DL (ref 6–8.5)
SODIUM SERPL-SCNC: 143 MMOL/L (ref 134–144)
TRIGL SERPL-MCNC: 213 MG/DL (ref 0–149)
TSH SERPL DL<=0.005 MIU/L-ACNC: 2.84 UIU/ML (ref 0.45–4.5)
VLDLC SERPL CALC-MCNC: 35 MG/DL (ref 5–40)

## 2025-07-09 ENCOUNTER — OFFICE VISIT (OUTPATIENT)
Age: 75
End: 2025-07-09
Payer: MEDICARE

## 2025-07-09 VITALS
WEIGHT: 179 LBS | HEART RATE: 67 BPM | HEIGHT: 64 IN | BODY MASS INDEX: 30.56 KG/M2 | DIASTOLIC BLOOD PRESSURE: 65 MMHG | TEMPERATURE: 97.2 F | OXYGEN SATURATION: 96 % | SYSTOLIC BLOOD PRESSURE: 128 MMHG

## 2025-07-09 DIAGNOSIS — I25.10 CORONARY ARTERY DISEASE INVOLVING NATIVE CORONARY ARTERY OF NATIVE HEART WITHOUT ANGINA PECTORIS: ICD-10-CM

## 2025-07-09 DIAGNOSIS — Z00.00 MEDICARE ANNUAL WELLNESS VISIT, SUBSEQUENT: ICD-10-CM

## 2025-07-09 DIAGNOSIS — I10 ESSENTIAL HYPERTENSION: Primary | ICD-10-CM

## 2025-07-09 DIAGNOSIS — F33.41 MAJOR DEPRESSIVE DISORDER, RECURRENT, IN PARTIAL REMISSION: ICD-10-CM

## 2025-07-09 DIAGNOSIS — E66.811 OBESITY (BMI 30.0-34.9): ICD-10-CM

## 2025-07-09 DIAGNOSIS — M85.89 OSTEOPENIA OF MULTIPLE SITES: ICD-10-CM

## 2025-07-09 DIAGNOSIS — R73.01 IFG (IMPAIRED FASTING GLUCOSE): ICD-10-CM

## 2025-07-09 DIAGNOSIS — F17.200 SMOKER: ICD-10-CM

## 2025-07-09 DIAGNOSIS — F32.9 REACTIVE DEPRESSION: ICD-10-CM

## 2025-07-09 DIAGNOSIS — E78.2 MIXED HYPERLIPIDEMIA: ICD-10-CM

## 2025-07-09 PROCEDURE — G8400 PT W/DXA NO RESULTS DOC: HCPCS | Performed by: INTERNAL MEDICINE

## 2025-07-09 PROCEDURE — G8427 DOCREV CUR MEDS BY ELIG CLIN: HCPCS | Performed by: INTERNAL MEDICINE

## 2025-07-09 PROCEDURE — 3074F SYST BP LT 130 MM HG: CPT | Performed by: INTERNAL MEDICINE

## 2025-07-09 PROCEDURE — G8417 CALC BMI ABV UP PARAM F/U: HCPCS | Performed by: INTERNAL MEDICINE

## 2025-07-09 PROCEDURE — 1090F PRES/ABSN URINE INCON ASSESS: CPT | Performed by: INTERNAL MEDICINE

## 2025-07-09 PROCEDURE — G0439 PPPS, SUBSEQ VISIT: HCPCS | Performed by: INTERNAL MEDICINE

## 2025-07-09 PROCEDURE — 4004F PT TOBACCO SCREEN RCVD TLK: CPT | Performed by: INTERNAL MEDICINE

## 2025-07-09 PROCEDURE — 1159F MED LIST DOCD IN RCRD: CPT | Performed by: INTERNAL MEDICINE

## 2025-07-09 PROCEDURE — 1160F RVW MEDS BY RX/DR IN RCRD: CPT | Performed by: INTERNAL MEDICINE

## 2025-07-09 PROCEDURE — 99214 OFFICE O/P EST MOD 30 MIN: CPT | Performed by: INTERNAL MEDICINE

## 2025-07-09 PROCEDURE — 1123F ACP DISCUSS/DSCN MKR DOCD: CPT | Performed by: INTERNAL MEDICINE

## 2025-07-09 PROCEDURE — 3017F COLORECTAL CA SCREEN DOC REV: CPT | Performed by: INTERNAL MEDICINE

## 2025-07-09 PROCEDURE — 3078F DIAST BP <80 MM HG: CPT | Performed by: INTERNAL MEDICINE

## 2025-07-25 DIAGNOSIS — F32.9 REACTIVE DEPRESSION: Primary | ICD-10-CM

## 2025-08-22 RX ORDER — HYDROCHLOROTHIAZIDE 25 MG/1
25 TABLET ORAL DAILY
Qty: 90 TABLET | Refills: 0 | Status: SHIPPED | OUTPATIENT
Start: 2025-08-22